# Patient Record
Sex: FEMALE | Race: WHITE | NOT HISPANIC OR LATINO | Employment: FULL TIME | URBAN - METROPOLITAN AREA
[De-identification: names, ages, dates, MRNs, and addresses within clinical notes are randomized per-mention and may not be internally consistent; named-entity substitution may affect disease eponyms.]

---

## 2017-09-22 ENCOUNTER — TRANSFERRED RECORDS (OUTPATIENT)
Dept: MULTI SPECIALTY CLINIC | Facility: CLINIC | Age: 27
End: 2017-09-22

## 2017-09-22 ENCOUNTER — OFFICE VISIT - HEALTHEAST (OUTPATIENT)
Dept: FAMILY MEDICINE | Facility: CLINIC | Age: 27
End: 2017-09-22

## 2017-09-22 DIAGNOSIS — Z13.1 SCREENING FOR DIABETES MELLITUS: ICD-10-CM

## 2017-09-22 DIAGNOSIS — F41.8 DEPRESSION WITH ANXIETY: ICD-10-CM

## 2017-09-22 DIAGNOSIS — Z13.220 SCREENING, LIPID: ICD-10-CM

## 2017-09-22 DIAGNOSIS — Z83.3 FAMILY HISTORY OF DIABETES MELLITUS (DM): ICD-10-CM

## 2017-09-22 DIAGNOSIS — Z12.4 PAPANICOLAOU SMEAR FOR CERVICAL CANCER SCREENING: ICD-10-CM

## 2017-09-22 DIAGNOSIS — Z83.438 FAMILY HISTORY OF HYPERLIPIDEMIA: ICD-10-CM

## 2017-09-22 DIAGNOSIS — Z00.00 ROUTINE GENERAL MEDICAL EXAMINATION AT A HEALTH CARE FACILITY: ICD-10-CM

## 2017-09-22 DIAGNOSIS — Z11.3 SCREENING FOR STD (SEXUALLY TRANSMITTED DISEASE): ICD-10-CM

## 2017-09-22 LAB
CHOLEST SERPL-MCNC: 159 MG/DL
CHOLEST SERPL-MCNC: 159 MG/DL
FASTING STATUS PATIENT QL REPORTED: YES
HDLC SERPL-MCNC: 74 MG/DL
HDLC SERPL-MCNC: 74 MG/DL
LDLC SERPL CALC-MCNC: 76 MG/DL
LDLC SERPL CALC-MCNC: 76 MG/DL
PAP-ABSTRACT: NORMAL
TRIGL SERPL-MCNC: 44 MG/DL
TRIGL SERPL-MCNC: 44 MG/DL

## 2017-09-22 ASSESSMENT — MIFFLIN-ST. JEOR: SCORE: 1430.86

## 2017-09-25 ENCOUNTER — COMMUNICATION - HEALTHEAST (OUTPATIENT)
Dept: FAMILY MEDICINE | Facility: CLINIC | Age: 27
End: 2017-09-25

## 2017-09-27 ENCOUNTER — COMMUNICATION - HEALTHEAST (OUTPATIENT)
Dept: FAMILY MEDICINE | Facility: CLINIC | Age: 27
End: 2017-09-27

## 2017-09-28 ENCOUNTER — COMMUNICATION - HEALTHEAST (OUTPATIENT)
Dept: SCHEDULING | Facility: CLINIC | Age: 27
End: 2017-09-28

## 2017-10-02 LAB
BKR LAB AP ABNORMAL BLEEDING: NO
BKR LAB AP BIRTH CONTROL/HORMONES: NORMAL
BKR LAB AP CERVICAL APPEARANCE: NORMAL
BKR LAB AP GYN ADEQUACY: NORMAL
BKR LAB AP GYN INTERPRETATION: NORMAL
BKR LAB AP HPV REFLEX: NORMAL
BKR LAB AP LMP: NORMAL
BKR LAB AP PATIENT STATUS: NORMAL
BKR LAB AP PREVIOUS ABNORMAL: NORMAL
BKR LAB AP PREVIOUS NORMAL: 2015
HIGH RISK?: NO
PATH REPORT.COMMENTS IMP SPEC: NORMAL
RESULT FLAG (HE HISTORICAL CONVERSION): NORMAL

## 2017-10-24 ENCOUNTER — OFFICE VISIT - HEALTHEAST (OUTPATIENT)
Dept: FAMILY MEDICINE | Facility: CLINIC | Age: 27
End: 2017-10-24

## 2017-10-24 DIAGNOSIS — F41.8 DEPRESSION WITH ANXIETY: ICD-10-CM

## 2017-12-05 ENCOUNTER — OFFICE VISIT - HEALTHEAST (OUTPATIENT)
Dept: FAMILY MEDICINE | Facility: CLINIC | Age: 27
End: 2017-12-05

## 2017-12-05 DIAGNOSIS — M54.2 NECK PAIN ON LEFT SIDE: ICD-10-CM

## 2017-12-05 DIAGNOSIS — R59.0 ADENOPATHY, CERVICAL: ICD-10-CM

## 2017-12-08 ENCOUNTER — HOSPITAL ENCOUNTER (OUTPATIENT)
Dept: ULTRASOUND IMAGING | Facility: CLINIC | Age: 27
Discharge: HOME | End: 2017-12-08
Attending: FAMILY MEDICINE

## 2017-12-08 DIAGNOSIS — M54.2 NECK PAIN ON LEFT SIDE: ICD-10-CM

## 2017-12-08 DIAGNOSIS — R59.0 ADENOPATHY, CERVICAL: ICD-10-CM

## 2018-04-17 ENCOUNTER — OFFICE VISIT - HEALTHEAST (OUTPATIENT)
Dept: FAMILY MEDICINE | Facility: CLINIC | Age: 28
End: 2018-04-17

## 2018-04-17 DIAGNOSIS — F41.8 DEPRESSION WITH ANXIETY: ICD-10-CM

## 2018-04-17 DIAGNOSIS — L98.9 BENIGN SKIN LESION: ICD-10-CM

## 2018-04-29 ENCOUNTER — COMMUNICATION - HEALTHEAST (OUTPATIENT)
Dept: FAMILY MEDICINE | Facility: CLINIC | Age: 28
End: 2018-04-29

## 2018-04-29 DIAGNOSIS — F41.8 DEPRESSION WITH ANXIETY: ICD-10-CM

## 2018-10-21 ENCOUNTER — COMMUNICATION - HEALTHEAST (OUTPATIENT)
Dept: FAMILY MEDICINE | Facility: CLINIC | Age: 28
End: 2018-10-21

## 2018-10-21 DIAGNOSIS — F41.8 DEPRESSION WITH ANXIETY: ICD-10-CM

## 2018-11-16 ENCOUNTER — OFFICE VISIT - HEALTHEAST (OUTPATIENT)
Dept: FAMILY MEDICINE | Facility: CLINIC | Age: 28
End: 2018-11-16

## 2018-11-16 DIAGNOSIS — F41.8 DEPRESSION WITH ANXIETY: ICD-10-CM

## 2018-11-16 DIAGNOSIS — R07.89 ATYPICAL CHEST PAIN: ICD-10-CM

## 2019-05-02 ENCOUNTER — OFFICE VISIT - HEALTHEAST (OUTPATIENT)
Dept: FAMILY MEDICINE | Facility: CLINIC | Age: 29
End: 2019-05-02

## 2019-05-02 DIAGNOSIS — F41.8 DEPRESSION WITH ANXIETY: ICD-10-CM

## 2019-05-02 DIAGNOSIS — I49.9 IRREGULAR HEART BEAT: ICD-10-CM

## 2019-05-02 ASSESSMENT — MIFFLIN-ST. JEOR: SCORE: 1506.83

## 2019-05-03 ENCOUNTER — COMMUNICATION - HEALTHEAST (OUTPATIENT)
Dept: FAMILY MEDICINE | Facility: CLINIC | Age: 29
End: 2019-05-03

## 2019-05-03 ENCOUNTER — HOSPITAL ENCOUNTER (OUTPATIENT)
Dept: CARDIOLOGY | Facility: CLINIC | Age: 29
Discharge: HOME | End: 2019-05-03
Attending: FAMILY MEDICINE

## 2019-05-03 DIAGNOSIS — I49.9 IRREGULAR HEART BEAT: ICD-10-CM

## 2019-07-22 ENCOUNTER — COMMUNICATION - HEALTHEAST (OUTPATIENT)
Dept: FAMILY MEDICINE | Facility: CLINIC | Age: 29
End: 2019-07-22

## 2019-07-22 DIAGNOSIS — F41.8 DEPRESSION WITH ANXIETY: ICD-10-CM

## 2019-12-05 ENCOUNTER — COMMUNICATION - HEALTHEAST (OUTPATIENT)
Dept: FAMILY MEDICINE | Facility: CLINIC | Age: 29
End: 2019-12-05

## 2019-12-09 ENCOUNTER — COMMUNICATION - HEALTHEAST (OUTPATIENT)
Dept: FAMILY MEDICINE | Facility: CLINIC | Age: 29
End: 2019-12-09

## 2019-12-19 ENCOUNTER — AMBULATORY - HEALTHEAST (OUTPATIENT)
Dept: FAMILY MEDICINE | Facility: CLINIC | Age: 29
End: 2019-12-19

## 2019-12-19 DIAGNOSIS — Z32.01 PREGNANCY TEST PERFORMED, PREGNANCY CONFIRMED: ICD-10-CM

## 2019-12-20 ENCOUNTER — OFFICE VISIT - HEALTHEAST (OUTPATIENT)
Dept: FAMILY MEDICINE | Facility: CLINIC | Age: 29
End: 2019-12-20

## 2019-12-20 DIAGNOSIS — Z32.01 PREGNANCY TEST PERFORMED, PREGNANCY CONFIRMED: ICD-10-CM

## 2019-12-20 LAB — HCG UR QL: POSITIVE

## 2019-12-20 ASSESSMENT — PATIENT HEALTH QUESTIONNAIRE - PHQ9: SUM OF ALL RESPONSES TO PHQ QUESTIONS 1-9: 4

## 2020-01-13 ENCOUNTER — PRENATAL OFFICE VISIT (OUTPATIENT)
Dept: NURSING | Facility: CLINIC | Age: 30
End: 2020-01-13
Payer: COMMERCIAL

## 2020-01-13 VITALS
WEIGHT: 181 LBS | BODY MASS INDEX: 30.9 KG/M2 | TEMPERATURE: 98.6 F | SYSTOLIC BLOOD PRESSURE: 111 MMHG | HEART RATE: 90 BPM | HEIGHT: 64 IN | DIASTOLIC BLOOD PRESSURE: 73 MMHG

## 2020-01-13 DIAGNOSIS — Z34.00 SUPERVISION OF NORMAL FIRST PREGNANCY: Primary | ICD-10-CM

## 2020-01-13 DIAGNOSIS — Z23 NEED FOR TDAP VACCINATION: ICD-10-CM

## 2020-01-13 PROBLEM — F41.8 DEPRESSION WITH ANXIETY: Status: ACTIVE | Noted: 2017-09-22

## 2020-01-13 LAB
ABO + RH BLD: NORMAL
ABO + RH BLD: NORMAL
ALBUMIN UR-MCNC: NEGATIVE MG/DL
APPEARANCE UR: CLEAR
BILIRUB UR QL STRIP: NEGATIVE
BLD GP AB SCN SERPL QL: NORMAL
BLOOD BANK CMNT PATIENT-IMP: NORMAL
COLOR UR AUTO: YELLOW
ERYTHROCYTE [DISTWIDTH] IN BLOOD BY AUTOMATED COUNT: 12.3 % (ref 10–15)
GLUCOSE UR STRIP-MCNC: NEGATIVE MG/DL
HBA1C MFR BLD: 5 % (ref 0–5.6)
HCT VFR BLD AUTO: 34.8 % (ref 35–47)
HGB BLD-MCNC: 11.9 G/DL (ref 11.7–15.7)
HGB UR QL STRIP: NEGATIVE
KETONES UR STRIP-MCNC: NEGATIVE MG/DL
LEUKOCYTE ESTERASE UR QL STRIP: NEGATIVE
MCH RBC QN AUTO: 32.6 PG (ref 26.5–33)
MCHC RBC AUTO-ENTMCNC: 34.2 G/DL (ref 31.5–36.5)
MCV RBC AUTO: 95 FL (ref 78–100)
NITRATE UR QL: NEGATIVE
PH UR STRIP: 6 PH (ref 5–7)
PLATELET # BLD AUTO: 301 10E9/L (ref 150–450)
RBC # BLD AUTO: 3.65 10E12/L (ref 3.8–5.2)
SOURCE: NORMAL
SP GR UR STRIP: <=1.005 (ref 1–1.03)
SPECIMEN EXP DATE BLD: NORMAL
UROBILINOGEN UR STRIP-ACNC: 0.2 EU/DL (ref 0.2–1)
WBC # BLD AUTO: 9.8 10E9/L (ref 4–11)

## 2020-01-13 PROCEDURE — 83036 HEMOGLOBIN GLYCOSYLATED A1C: CPT | Performed by: OBSTETRICS & GYNECOLOGY

## 2020-01-13 PROCEDURE — 81003 URINALYSIS AUTO W/O SCOPE: CPT | Performed by: OBSTETRICS & GYNECOLOGY

## 2020-01-13 PROCEDURE — 99207 ZZC NO CHARGE NURSE ONLY: CPT

## 2020-01-13 PROCEDURE — 85027 COMPLETE CBC AUTOMATED: CPT | Performed by: OBSTETRICS & GYNECOLOGY

## 2020-01-13 PROCEDURE — 86901 BLOOD TYPING SEROLOGIC RH(D): CPT | Performed by: OBSTETRICS & GYNECOLOGY

## 2020-01-13 PROCEDURE — 36415 COLL VENOUS BLD VENIPUNCTURE: CPT | Performed by: OBSTETRICS & GYNECOLOGY

## 2020-01-13 PROCEDURE — 86850 RBC ANTIBODY SCREEN: CPT | Performed by: OBSTETRICS & GYNECOLOGY

## 2020-01-13 PROCEDURE — 86762 RUBELLA ANTIBODY: CPT | Performed by: OBSTETRICS & GYNECOLOGY

## 2020-01-13 PROCEDURE — 87389 HIV-1 AG W/HIV-1&-2 AB AG IA: CPT | Performed by: OBSTETRICS & GYNECOLOGY

## 2020-01-13 PROCEDURE — 86780 TREPONEMA PALLIDUM: CPT | Performed by: OBSTETRICS & GYNECOLOGY

## 2020-01-13 PROCEDURE — 86900 BLOOD TYPING SEROLOGIC ABO: CPT | Performed by: OBSTETRICS & GYNECOLOGY

## 2020-01-13 PROCEDURE — 87340 HEPATITIS B SURFACE AG IA: CPT | Performed by: OBSTETRICS & GYNECOLOGY

## 2020-01-13 PROCEDURE — 87086 URINE CULTURE/COLONY COUNT: CPT | Performed by: OBSTETRICS & GYNECOLOGY

## 2020-01-13 RX ORDER — PNV NO.118/IRON FUMARATE/FA 29 MG-1 MG
TABLET,CHEWABLE ORAL
COMMUNITY
End: 2021-09-01

## 2020-01-13 SDOH — HEALTH STABILITY: MENTAL HEALTH
STRESS IS WHEN SOMEONE FEELS TENSE, NERVOUS, ANXIOUS, OR CAN'T SLEEP AT NIGHT BECAUSE THEIR MIND IS TROUBLED. HOW STRESSED ARE YOU?: NOT AT ALL

## 2020-01-13 SDOH — SOCIAL STABILITY: SOCIAL INSECURITY: WITHIN THE LAST YEAR, HAVE YOU BEEN AFRAID OF YOUR PARTNER OR EX-PARTNER?: NO

## 2020-01-13 SDOH — SOCIAL STABILITY: SOCIAL INSECURITY
WITHIN THE LAST YEAR, HAVE TO BEEN RAPED OR FORCED TO HAVE ANY KIND OF SEXUAL ACTIVITY BY YOUR PARTNER OR EX-PARTNER?: NO

## 2020-01-13 SDOH — SOCIAL STABILITY: SOCIAL INSECURITY
WITHIN THE LAST YEAR, HAVE YOU BEEN KICKED, HIT, SLAPPED, OR OTHERWISE PHYSICALLY HURT BY YOUR PARTNER OR EX-PARTNER?: NO

## 2020-01-13 SDOH — SOCIAL STABILITY: SOCIAL NETWORK: ARE YOU MARRIED, WIDOWED, DIVORCED, SEPARATED, NEVER MARRIED, OR LIVING WITH A PARTNER?: MARRIED

## 2020-01-13 SDOH — SOCIAL STABILITY: SOCIAL INSECURITY: WITHIN THE LAST YEAR, HAVE YOU BEEN HUMILIATED OR EMOTIONALLY ABUSED IN OTHER WAYS BY YOUR PARTNER OR EX-PARTNER?: NO

## 2020-01-13 ASSESSMENT — MIFFLIN-ST. JEOR: SCORE: 1522.04

## 2020-01-13 NOTE — LETTER
January 14, 2020      Nehal Wadsworth  [address] [facility] [address]        Dear ,    We are writing to inform you of your test results.    Your test results fall within the expected range(s) or remain unchanged from previous results.  Please continue with current treatment plan.    Resulted Orders   UA without Microscopic   Result Value Ref Range    Color Urine Yellow     Appearance Urine Clear     Glucose Urine Negative NEG^Negative mg/dL    Bilirubin Urine Negative NEG^Negative    Ketones Urine Negative NEG^Negative mg/dL    Specific Gravity Urine <=1.005 1.003 - 1.035    Blood Urine Negative NEG^Negative    pH Urine 6.0 5.0 - 7.0 pH    Protein Albumin Urine Negative NEG^Negative mg/dL    Urobilinogen Urine 0.2 0.2 - 1.0 EU/dL    Nitrite Urine Negative NEG^Negative    Leukocyte Esterase Urine Negative NEG^Negative    Source Midstream Urine    Hemoglobin A1c   Result Value Ref Range    Hemoglobin A1C 5.0 0 - 5.6 %      Comment:      Normal <5.7% Prediabetes 5.7-6.4%  Diabetes 6.5% or higher - adopted from ADA   consensus guidelines.     ABO/Rh type and screen   Result Value Ref Range    ABO A     RH(D) Pos     Antibody Screen Neg     Test Valid Only At          [facility]    Specimen Expires 01/16/2020    Hepatitis B surface antigen   Result Value Ref Range    Hep B Surface Agn Nonreactive NR^Nonreactive   CBC with platelets   Result Value Ref Range    WBC 9.8 4.0 - 11.0 10e9/L    RBC Count 3.65 (L) 3.8 - 5.2 10e12/L    Hemoglobin 11.9 11.7 - 15.7 g/dL    Hematocrit 34.8 (L) 35.0 - 47.0 %    MCV 95 78 - 100 fl    MCH 32.6 26.5 - 33.0 pg    MCHC 34.2 31.5 - 36.5 g/dL    RDW 12.3 10.0 - 15.0 %    Platelet Count 301 150 - 450 10e9/L   HIV Antigen Antibody Combo   Result Value Ref Range    HIV Antigen Antibody Combo Nonreactive NR^Nonreactive          Comment:      HIV-1 p24 Ag & HIV-1/HIV-2 Ab Not Detected   Rubella Antibody IgG Quantitative   Result  Value Ref Range    Rubella Antibody IgG Quantitative 17 IU/mL      Comment:      Positive.  Suggests previous exposure or immunization and probable immunity  Reference Range:    Unvaccinated Negative 0-7 IU/mL  Vaccinated or previous exposure Positive 10 IU/ml or greater     Treponema Abs w Reflex to RPR and Titer   Result Value Ref Range    Treponema Antibodies Nonreactive NR^Nonreactive   Urine Culture Aerobic Bacterial   Result Value Ref Range    Specimen Description Midstream Urine     Culture Micro       <10,000 colonies/mL  mixed urogenital robin  Susceptibility testing not routinely done         If you have any questions or concerns, please call the clinic at the number listed above.       Sincerely,        Caitlyn Montero MD

## 2020-01-14 LAB
BACTERIA SPEC CULT: NORMAL
HBV SURFACE AG SERPL QL IA: NONREACTIVE
HIV 1+2 AB+HIV1 P24 AG SERPL QL IA: NONREACTIVE
RUBV IGG SERPL IA-ACNC: 17 IU/ML
SPECIMEN SOURCE: NORMAL
T PALLIDUM AB SER QL: NONREACTIVE

## 2020-01-17 ENCOUNTER — PRENATAL OFFICE VISIT (OUTPATIENT)
Dept: OBGYN | Facility: CLINIC | Age: 30
End: 2020-01-17
Payer: COMMERCIAL

## 2020-01-17 VITALS
SYSTOLIC BLOOD PRESSURE: 127 MMHG | DIASTOLIC BLOOD PRESSURE: 89 MMHG | BODY MASS INDEX: 31.14 KG/M2 | WEIGHT: 180 LBS | HEART RATE: 75 BPM

## 2020-01-17 DIAGNOSIS — Z34.91 PRENATAL CARE IN FIRST TRIMESTER: Primary | ICD-10-CM

## 2020-01-17 DIAGNOSIS — Z11.3 SCREEN FOR STD (SEXUALLY TRANSMITTED DISEASE): ICD-10-CM

## 2020-01-17 PROCEDURE — 99207 ZZC FIRST OB VISIT: CPT | Performed by: OBSTETRICS & GYNECOLOGY

## 2020-01-17 PROCEDURE — 87491 CHLMYD TRACH DNA AMP PROBE: CPT | Performed by: OBSTETRICS & GYNECOLOGY

## 2020-01-17 PROCEDURE — 87591 N.GONORRHOEAE DNA AMP PROB: CPT | Performed by: OBSTETRICS & GYNECOLOGY

## 2020-01-17 NOTE — LETTER
[address] [address]  [phone]      January 20, 2020      Nehal Wadsworth  [address] [place] [id number]              Dear Nehal,    Your recent gonorrhea and chlamydia cultures were negative.  If you have any questions please call the nurse line at [phone].      Sincerely,      Caitlyn Montero MD

## 2020-01-17 NOTE — PATIENT INSTRUCTIONS
Patient Education     Adapting to Pregnancy: First Trimester    As your body adjusts, you may have to change or limit your daily activities. You ll need more rest. You may also need to use the energy you have more wisely.  Your changing body  Almost every part of your body is affected as you adapt to pregnancy. The uterus and cervix will begin to soften right away. You may not look very pregnant during the first 3 months. But you are likely to have some common signs of early pregnancy:    Nausea    Fatigue    Frequent urination    Mood swings    Bloating of the belly    Constipation    Heartburn    Missed or light periods (first trimester bleeding)    Nipple or breast tenderness and breast swelling  It s not too late to start good habits  What matters most is protecting your baby from this moment on. If you smoke, drink alcohol, or use drugs, now is the time to stop. If you need help, talk with your healthcare provider:    Smoking increases the risk of stillbirth or having a low-birth-weight baby. If you smoke, quit now.    Alcohol and drugs have been linked with miscarriage, birth defects, intellectual disability, and low birth weight. Do not drink alcohol or take drugs.  Tips to relieve nausea  Although nausea can happen at any time of the day, it may be worse in the morning. To help prevent nausea:    Eat small, light meals at frequent intervals.    Drink fluids often.    Get up slowly. Eat a few unsalted crackers before you get out of bed.    Avoid smells that bother you.    Avoid spicy and fatty foods.    Eat an ice pop in your favorite flavor.    Get plenty of rest.    Ask your healthcare provider about taking fadi or vitamin B6 for nausea and vomiting.    Talk with your healthcare provider if you take vitamins that upset your stomach.  Work concerns  The end of the first trimester is a good time to discuss working during pregnancy with your employer. Follow your healthcare provider s advice if your job  "needs you to stand for a long time, work with hazardous tools, or even sit at a desk all day. Your workspace, workload, or scheduled hours may need to be adjusted. Perhaps you can change body postures more often or take an extra break.  Advice for travel  Talk to your healthcare provider first, but the second trimester may be the best time for any travel. You may be advised to avoid certain trips while you re pregnant. Food and water can be concerns in developing countries. Travel by car is a good choice, as you can stop, get out, and stretch. Bring snacks and water along. Fasten the lap belt below your belly, low over your hips. Also be sure to wear the shoulder harness.  Intimacy  Unless your healthcare provider tells you to, there is no reason to stop having sex while you re pregnant. You or your partner may notice changes in desire. Desire may be less in the first trimester, due to nausea and fatigue. In the second trimester, sex may be very enjoyable. The third trimester can be a challenge comfort-wise. Try different positions and see what s best for you both.  Date Last Reviewed: 10/1/2017    0738-3403 [employer]. [address] [place] [address]. All rights reserved. This information is not intended as a substitute for professional medical care. Always follow your healthcare professional's instructions.           Patient Education     Pregnancy: Your First Trimester Changes  The first trimester is a time of rapid development for your baby. Because your baby is growing so quickly, it is important that you start a healthy lifestyle right away. By the end of the first trimester, your baby has formed all of its major body organs and weighs just over an ounce.     Actual size of baby is 1/4\"    Month 1 (weeks 1 to 4)  The placenta (the organ that nourishes your baby) begins to form. The brain, spinal cord, heart, gastrointestinal tract, and lungs begin to develop. Your baby is about " "1/4-inch long by the end of the first month.     Actual size of baby is 1\"      Month 2 (weeks 5 to 8)  All of your baby s major body organs form. The face, fingers, toes, ears, and eyes appear. By the end of the month, your baby is about 1-inch long.     Actual size of baby is 3\"      Month 3 (weeks 9 to 12)  Your baby can open and close its fists and mouth. The sexual organs begin to form. As the first trimester ends, your baby is about 3-inches long.  Date Last Reviewed: 10/1/2017    8109-0642 [employer]. [address] [place] [address], [place] [address]. All rights reserved. This information is not intended as a substitute for professional medical care. Always follow your healthcare professional's instructions.           "

## 2020-01-19 LAB
C TRACH DNA SPEC QL NAA+PROBE: NEGATIVE
N GONORRHOEA DNA SPEC QL NAA+PROBE: NEGATIVE
SPECIMEN SOURCE: NORMAL
SPECIMEN SOURCE: NORMAL

## 2020-01-21 NOTE — PROGRESS NOTES
CHANEL: 2020, by Last Menstrual Period.   SUBJECTIVE:     HPI:  Nehal Wadsworth is a 29 year old  at 11w3d by LMP who presents today for first OB visit.  Patient reports that she had a small amount of uterine cramping early in pregnancy, but has had none lately.  She denies any vaginal bleeding, vaginal discharge, dysuria, fever, chills, changes in vision, chest pain, chest pressure, shortness of breath, vomiting, diarrhea, or edema.  She states that she sometimes gets mild headaches that tea helps resolve and has had minimal nausea in pregnancy.    OBGYN Hx:   LMP 19  Menses q28 days, regular  STD hx: none  Pap hx- 17 NILM, next due 2020    PMH- anxiety and depression, feels mood is overall good now.  Was previously on lexapro   PSH- eye surgery  Meds- PNV, unisom  Allergies- MMR vaccine  SH- quit smoking when found out was pregnant, denies any drug or alcohol use in pregnancy  FH- denies any bleeding or clotting disorders.  Denies any family history of cancer      OBJECTIVE:     EXAM:  /89   Pulse 75   Wt 81.6 kg (180 lb)   LMP 2019   BMI 31.14 kg/m   Body mass index is 31.14 kg/m .    GENERAL: healthy, alert and no distress  EYES: Eyes grossly normal to inspection, conjunctivae and sclerae normal  NECK: no adenopathy, no asymmetry, masses, or scars and thyroid normal to palpation  RESP: lungs clear to auscultation - no rales, rhonchi or wheezes  BREAST: normal without masses, tenderness or nipple discharge and no palpable axillary masses or adenopathy  CV: regular rate and rhythm, normal S1 S2, no S3 or S4, no murmur, click or rub, no peripheral edemaABDOMEN: soft, nontender, no hepatosplenomegaly, no masses and bowel sounds normal   (female): normal female external genitalia, normal urethral meatus, vaginal mucosa pink, moist, well rugated, and normal cervix, adnexa without masses or tenderness, 10 week sized uterus   MS: no gross musculoskeletal defects noted, no edema  SKIN: no  suspicious lesions or rashes  NEURO: Normal strength and tone, mentation intact and speech normal  PSYCH: mentation appears normal, affect normal/bright    BSUS: single IUP with + cardiac activity    ASSESSMENT/PLAN:     Nehal Wadsworth is a 29 year old  at 11w3d by LMP who presents today for first OB visit, currently doing well.    (Z34.91) Prenatal care in first trimester  (primary encounter diagnosis)  Comment: Reviewed normal timing of visit in pregnancy, team structure on labor and delivery including resident and medical student participation in care.  Discussed genetic testing and patient declines.  Will plan for dating ultraosund.   Plan:  OB < 14 Weeks Single  Counseling given:   - Follow up in 4 weeks for return OB visit.  - Recommended weight gain for pregnancy: 11-20    (Z11.3) Screen for STD (sexually transmitted disease)  Comment: Routine STD screen in pregnancy completed.  Plan: NEISSERIA GONORRHOEA PCR, CHLAMYDIA TRACHOMATIS        PCR    Caitlyn Montero MD

## 2020-02-04 ENCOUNTER — ANCILLARY PROCEDURE (OUTPATIENT)
Dept: ULTRASOUND IMAGING | Facility: CLINIC | Age: 30
End: 2020-02-04
Attending: OBSTETRICS & GYNECOLOGY
Payer: COMMERCIAL

## 2020-02-04 DIAGNOSIS — Z34.91 PRENATAL CARE IN FIRST TRIMESTER: ICD-10-CM

## 2020-02-04 PROCEDURE — 76801 OB US < 14 WKS SINGLE FETUS: CPT | Performed by: OBSTETRICS & GYNECOLOGY

## 2020-02-13 ENCOUNTER — PRENATAL OFFICE VISIT (OUTPATIENT)
Dept: OBGYN | Facility: CLINIC | Age: 30
End: 2020-02-13
Payer: COMMERCIAL

## 2020-02-13 VITALS
SYSTOLIC BLOOD PRESSURE: 109 MMHG | BODY MASS INDEX: 32.35 KG/M2 | WEIGHT: 187 LBS | HEART RATE: 81 BPM | DIASTOLIC BLOOD PRESSURE: 73 MMHG

## 2020-02-13 DIAGNOSIS — Z34.92 PRENATAL CARE IN SECOND TRIMESTER: Primary | ICD-10-CM

## 2020-02-13 PROCEDURE — 99207 ZZC PRENATAL VISIT: CPT | Performed by: OBSTETRICS & GYNECOLOGY

## 2020-02-13 NOTE — PROGRESS NOTES
[facility]- OBGYN    CC: routine prenatal visit.    S:Nehal Wadsworth is a 29 year old  at 14w6d by LMP c/w 13w4d ultrasound  who presents today for routine prenatal visit.  Patient reports that her nausea is now resolved.  She has had continued constipation issues.  She has been drinking prune juice with some improvement. She has rare dull low pelvic cramping.  Patient denies any vaginal bleeding of vaginal discharge.  She denies any headache, changes in vision, chest pain, chest pressure, shortness of breath, nausea, vomiting, diarrhea.      O:   Patient Vitals for the past 24 hrs:   BP Pulse Weight   20 1153 109/73 81 84.8 kg (187 lb)   ]  Exam:  General- awake, alert, answering questions appropriately, appears comfortable  CV- regular rate  Lung- breathing comfortably on room air  Ext- bilateral lower extremities with no edema    Doptones- 152 bpm    A&P: Nehal Wadsworth is a 29 year old  at 14w6d by LMP c/w 13w4d ultrasound  who presents today for routine prenatal visit.    (Z34.92) Prenatal care in second trimester  (primary encounter diagnosis)  Comment: Overall doing well.  Patient would and fiance had discussion and are thinking they do want NIPT testing.  MFM referral placed.  Reviewed recommendation for anatomy ultrasound in 4-6 weeks.   Plan: US OB > 14 Weeks, [facility]         REFERRAL-PREGNANCY        Routine prenatal visit in 4 weeks (same day as us)      Caitlyn Montero MD

## 2020-02-13 NOTE — PATIENT INSTRUCTIONS
Patient Education     Adapting to Pregnancy: Second Trimester    Keep up the healthy habits you started in your first trimester. You might be a little more tired than normal. So plan your day wisely. Look at the tips below and choose the ones that suit your lifestyle.  If you have any questions, check with your healthcare provider.  If you work  If you can, adjust your work with your employer to fit your needs. Try these tips:    If you stand for long periods, find ways to do some tasks while sitting. Also, try to stand with 1 foot resting on a low stool or ledge. Shift your weight from foot to foot often. Wear low-heeled shoes.    If you sit, keep your knees level with your hips. Rest your feet on a firm surface. Sit tall with support for your low back.    If you work long hours, ask about adjusting your schedule. Try taking shorter breaks more often.  When you travel  The second trimester may be the best time for any travel. Talk to your healthcare provider about any special plans you may need to make. Always:    Wear a seat belt. Fasten the lap part under your belly. Wear the shoulder part also.    Take breaks often during long trips by car or plane. Move around to stretch your legs.    Drink plenty of fluids on flights. The air in plane cabins is very dry.    Avoid hot climates or high altitudes if you are not used to them.    Avoid places where the food and water might make you sick.    Make sure you are up-to-date on all immunizations, including the flu vaccine. This is especially important when traveling overseas.  Taking time to relax  Find time to rest and relax at work or at home:    Take short time-outs daily. Do relaxation exercises.    Breathe deeply during stressful times.    Try not to take on too much. Plan tasks for times when you have the most energy.    Take naps when you can. Or just sit and relax.    After week 16, avoid lying on your back for more than a few minutes. Instead, lie on your  side. Switch sides often.  Continuing as lovers  Unless your healthcare provider tells you otherwise, there is no reason to stop having sex now. Blood supply increases to the pelvic area in the second trimester. Because of this, sex might be more enjoyable. Try different positions and see what s best. Also, talk to your partner about any changes in desire. Spotting may happen after sex. Be sure to let your healthcare provider know if there is heavy bleeding.  Keeping your environment safe  You can still clean house and use scented products. Just take some simple precautions:    Wear gloves when using cleaning fluids.    Open windows to let in fresh air. Use a fan if you paint.    Avoid secondhand smoke.    Don t breathe fumes from nail polish, hair spray, cleansers, or other chemicals.  Date Last Reviewed: 1/1/2018 2000-2019 [employer]. [address] [place] [address]. All rights reserved. This information is not intended as a substitute for professional medical care. Always follow your healthcare professional's instructions.           Patient Education     Pregnancy: Your Second Trimester Changes  Each day, you and your baby are changing and growing together. Here s a quick look at what s happening to both of you.  How you are changing  Even when you don t notice it, your body is adapting to meet the needs of your growing baby. The changes in your body might also affect your moods.  Your body  Your uterus expands as baby grows. As the weeks go by, you will feel more pressure on your bladder, stomach, and other organs. You may notice some skin color changes on your forehead, nose, or cheeks. Freckles may darken, and moles may grow. You may notice a darker line on your abdomen between your belly button and pubic bone in the midline.  Your moods  The second trimester is often easier than the first. Still, be prepared for mood swings. These are due to the increase in hormones (chemicals that  affect the way organs work) produced by your body. These mood swings are a normal part of pregnancy.  How your baby is growing          Month 4  Baby s heartbeat may be heard with a Doppler (hand-held ultrasound device) by 9 to 10 weeks. Eyebrows, eyelashes, and fingernails begin to form.  Month 5  You may feel your baby move. After a growth spurt, your baby nears 10 inches. Month 6  Baby s fingerprints have formed. Your baby weighs about 1 to 2 pounds and is about 12 inches long.   Date Last Reviewed: 1/1/2018 2000-2019 [employer]. [address] [place] [address], [place] [address]. All rights reserved. This information is not intended as a substitute for professional medical care. Always follow your healthcare professional's instructions.

## 2020-03-11 ENCOUNTER — HEALTH MAINTENANCE LETTER (OUTPATIENT)
Age: 30
End: 2020-03-11

## 2020-03-17 ENCOUNTER — ANCILLARY PROCEDURE (OUTPATIENT)
Dept: ULTRASOUND IMAGING | Facility: CLINIC | Age: 30
End: 2020-03-17
Attending: OBSTETRICS & GYNECOLOGY
Payer: COMMERCIAL

## 2020-03-17 ENCOUNTER — PRENATAL OFFICE VISIT (OUTPATIENT)
Dept: OBGYN | Facility: CLINIC | Age: 30
End: 2020-03-17
Attending: OBSTETRICS & GYNECOLOGY
Payer: COMMERCIAL

## 2020-03-17 VITALS
DIASTOLIC BLOOD PRESSURE: 75 MMHG | HEART RATE: 67 BPM | SYSTOLIC BLOOD PRESSURE: 114 MMHG | BODY MASS INDEX: 32.87 KG/M2 | WEIGHT: 190 LBS

## 2020-03-17 DIAGNOSIS — Z34.92 PRENATAL CARE IN SECOND TRIMESTER: ICD-10-CM

## 2020-03-17 DIAGNOSIS — Z34.92 PRENATAL CARE IN SECOND TRIMESTER: Primary | ICD-10-CM

## 2020-03-17 PROCEDURE — 76805 OB US >/= 14 WKS SNGL FETUS: CPT | Performed by: OBSTETRICS & GYNECOLOGY

## 2020-03-17 PROCEDURE — 99207 ZZC PRENATAL VISIT: CPT | Performed by: OBSTETRICS & GYNECOLOGY

## 2020-03-18 NOTE — PROGRESS NOTES
Doing well.   Feeling movement.   Fetal survey prelim normal.   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 Performing rigid hand hygiene and other safety measures.   Discussed likely phone visit at 24 weeks, then live visit at 28 weeks for GCT and TDaP. Rh pos.  Having carpal tunnel symptoms, pre-existing but worsening. Wearing splints at night, compression sleeves while typing.

## 2020-04-06 ENCOUNTER — TELEPHONE (OUTPATIENT)
Dept: MATERNAL FETAL MEDICINE | Facility: CLINIC | Age: 30
End: 2020-04-06

## 2020-04-06 NOTE — TELEPHONE ENCOUNTER
KAMILAM received referral from [facility].  Calls made to patient, messages were left, and no return call from patient.  Referring clinic notified, removing order.       Janet Estrella

## 2020-04-07 ENCOUNTER — TELEPHONE (OUTPATIENT)
Dept: OBGYN | Facility: CLINIC | Age: 30
End: 2020-04-07

## 2020-04-07 NOTE — TELEPHONE ENCOUNTER
"Patient currently scheduled as phone visit, but appointment notes state \"KEEP\". Please advise if patient should be seen in clinic or as a phone visit. Thank you!  Angie Estrella,      "

## 2020-04-14 ENCOUNTER — PRENATAL OFFICE VISIT (OUTPATIENT)
Dept: OBGYN | Facility: CLINIC | Age: 30
End: 2020-04-14
Payer: COMMERCIAL

## 2020-04-14 DIAGNOSIS — Z34.80 SUPERVISION OF OTHER NORMAL PREGNANCY, ANTEPARTUM: Primary | ICD-10-CM

## 2020-04-14 PROCEDURE — 99207 ZZC PRENATAL VISIT: CPT | Performed by: OBSTETRICS & GYNECOLOGY

## 2020-04-14 NOTE — PROGRESS NOTES
Phone visit for modified prenatal care for COVID.  Will send Covid AVS.   Acid reflux, will try antacids.  Fetus active.  Will plan office visit next, with GCT and Tdap.  RTC 4 weeks.  AM

## 2020-04-14 NOTE — PATIENT INSTRUCTIONS
The best way to prevent an infection is to avoid being exposed to the virus. We recommend that you wash your hands frequently and avoid touching your eyes, nose or mouth.  Practice social distancing by staying home as much as possible, avoiding gatherings and minimize trips for essentials. You should especially avoid any contact with people who are sick. It is possible that people who have the virus have little or no symptoms which is why social distancing is so important to avoid spreading the virus. Clean frequently touched surfaces daily. If you do start to have symptoms such as cough, fever or mild shortness of breath, you should stay home for at least 14 days.  If your symptoms are worrisome or severe or you are scheduled during this time to have a clinic visit you should call us at [phone].    Due to anticipated shortage of blood products we recommend all pregnant women take an iron supplement (ferrous gluconate or ferrous sulfate) in addition to a prenatal vitamin.     The hospital has strict visitor restrictions at this time for your safety. Please be aware that visitor restrictions are subject to change. You are allowed to have one healthy adult visitor during your hospital stay, it must be the same person the entire time and they must stay with you at the hospital the entire time (they are not allowed to come and go).     For information about Covid-19 and pregnancy we look to the center for disease control, the CDC. You can look at this information online at:   https://www.cdc.gov/coronavirus/2019-ncov/hcp/pregnant-women-faq.html    [email or web address]

## 2020-05-12 ENCOUNTER — PRENATAL OFFICE VISIT (OUTPATIENT)
Dept: OBGYN | Facility: CLINIC | Age: 30
End: 2020-05-12
Payer: COMMERCIAL

## 2020-05-12 VITALS
DIASTOLIC BLOOD PRESSURE: 80 MMHG | BODY MASS INDEX: 33.68 KG/M2 | HEART RATE: 76 BPM | WEIGHT: 197.3 LBS | SYSTOLIC BLOOD PRESSURE: 128 MMHG | HEIGHT: 64 IN | TEMPERATURE: 98.3 F

## 2020-05-12 DIAGNOSIS — Z34.92 PRENATAL CARE IN SECOND TRIMESTER: ICD-10-CM

## 2020-05-12 DIAGNOSIS — Z34.80 SUPERVISION OF OTHER NORMAL PREGNANCY, ANTEPARTUM: Primary | ICD-10-CM

## 2020-05-12 DIAGNOSIS — A63.0 GENITAL WARTS: ICD-10-CM

## 2020-05-12 DIAGNOSIS — Z23 NEED FOR TDAP VACCINATION: ICD-10-CM

## 2020-05-12 LAB
GLUCOSE 1H P 50 G GLC PO SERPL-MCNC: 115 MG/DL (ref 60–129)
HGB BLD-MCNC: 11.2 G/DL (ref 11.7–15.7)

## 2020-05-12 PROCEDURE — 00000218 ZZHCL STATISTIC OBHBG - HEMOGLOBIN: Performed by: OBSTETRICS & GYNECOLOGY

## 2020-05-12 PROCEDURE — 82950 GLUCOSE TEST: CPT | Performed by: OBSTETRICS & GYNECOLOGY

## 2020-05-12 PROCEDURE — 99207 ZZC PRENATAL VISIT: CPT | Performed by: OBSTETRICS & GYNECOLOGY

## 2020-05-12 PROCEDURE — 36415 COLL VENOUS BLD VENIPUNCTURE: CPT | Performed by: OBSTETRICS & GYNECOLOGY

## 2020-05-12 PROCEDURE — 90471 IMMUNIZATION ADMIN: CPT | Performed by: OBSTETRICS & GYNECOLOGY

## 2020-05-12 PROCEDURE — 90715 TDAP VACCINE 7 YRS/> IM: CPT | Performed by: OBSTETRICS & GYNECOLOGY

## 2020-05-12 ASSESSMENT — ANXIETY QUESTIONNAIRES
7. FEELING AFRAID AS IF SOMETHING AWFUL MIGHT HAPPEN: NOT AT ALL
5. BEING SO RESTLESS THAT IT IS HARD TO SIT STILL: NOT AT ALL
3. WORRYING TOO MUCH ABOUT DIFFERENT THINGS: NOT AT ALL
GAD7 TOTAL SCORE: 0
2. NOT BEING ABLE TO STOP OR CONTROL WORRYING: NOT AT ALL
1. FEELING NERVOUS, ANXIOUS, OR ON EDGE: NOT AT ALL
IF YOU CHECKED OFF ANY PROBLEMS ON THIS QUESTIONNAIRE, HOW DIFFICULT HAVE THESE PROBLEMS MADE IT FOR YOU TO DO YOUR WORK, TAKE CARE OF THINGS AT HOME, OR GET ALONG WITH OTHER PEOPLE: NOT DIFFICULT AT ALL
6. BECOMING EASILY ANNOYED OR IRRITABLE: NOT AT ALL

## 2020-05-12 ASSESSMENT — PATIENT HEALTH QUESTIONNAIRE - PHQ9
SUM OF ALL RESPONSES TO PHQ QUESTIONS 1-9: 2
5. POOR APPETITE OR OVEREATING: NOT AT ALL

## 2020-05-12 ASSESSMENT — MIFFLIN-ST. JEOR: SCORE: 1595.98

## 2020-05-12 NOTE — Clinical Note
Pap smear done on this date: 9/22/2017 (approximately), by this group: [facility], results were NIL.

## 2020-05-12 NOTE — PROGRESS NOTES
Please abstract the following data from this visit with this patient into the appropriate field in Epic:    Tests that can be patient reported without a hard copy:    Pap smear done on this date: 9/22/2017 (approximately), [facility], results were NIL.       Clinic Administered Medication Documentation      Injectable Medication Documentation    Patient was given Tdap. Prior to medication administration, verified patients identity using patient s name and date of birth. Please see MAR and medication order for additional information. Patient instructed to remain in clinic for 15 minutes.      Was entire vial of medication used? Yes  Vial/Syringe: Syringe  Expiration Date:  3/21/2022  Was this medication supplied by the patient? No

## 2020-05-12 NOTE — PATIENT INSTRUCTIONS
Patient Education     Adapting to Pregnancy: Third Trimester    Although common during pregnancy, some discomforts may seem worse in the final weeks. Simple lifestyle changes can help. Take care of yourself. And ask your partner to help out with small tasks.  Limiting leg problems  Ways to combat leg issues:    Wear support hose all day.    Avoid snug shoes and clothes that bind, like tight pants and socks with elastic tops.    Sit with your feet and legs raised often.  Caring for your breasts  Tips to follow include:    Wash with plain water. Avoid using harsh soaps or rubbing alcohol. They may cause dryness.    Wear a nursing bra for extra support. It can also hide any leaks from your nipples.  Controlling hemorrhoids  Ways to avoid hemorrhoids include:    Eat foods that are high in fiber. Also, exercise and drink enough fluids. This will reduce constipation and hemorrhoids.    Sleep and nap on your side. This limits pressure on the veins of your rectum.    Try not to stand or sit for long periods.  Controlling back pain  As your body changes during pregnancy, your back must work in new ways. Back pain is due to many causes. Physical changes in your body can strain your back and its supporting muscles. Also, hormones (chemicals that carry messages throughout the body) increase during pregnancy. This can affect how your muscles and joints work together. All of these changes can lead to pain. Pain may be felt in the upper or lower back. Pain is also common in the pelvis. Some pregnant women have sciatica. This is pain caused by pressure on the sciatic nerve running down the back of the leg. Ask your healthcare provider for specific tips and exercises to help control your back pain.  Tips to help you rest  Good rest and sleep will help you feel better. Here are some ideas:    Ask your partner to massage your shoulders, neck, or back.    Limit the errands you do each day.    Lie down in the afternoon or after work  for a few minutes.    Take a warm bath before you go to sleep.    Drink warm milk or teas without caffeine.    Avoid coffee, black tea, and cola.  Stopping heartburn    Avoid spicy, greasy, fried, or acidic foods.    Eat small amounts more often. Eat slowly.   Wait 2 hours after eating before lying down.    Sleep with your upper body raised 6 inches.   Managing mood swings  Ways to manage mood swings include:    Know that mood changes are normal.    Exercise often, but get plenty of rest.    Address any concerns and limit stress. Talking to your partner, other women, or your healthcare provider may help.  Dealing with urinary frequency  Tips to deal with having to urinate often include:    Drink plenty of water all day. If you drink a lot in the evening, though, you may have to get up more in the night.    Limit coffee, black tea, and cola.  Date Last Reviewed: 2/1/2018 2000-2019 [employer]. [address] [place] [address]. All rights reserved. This information is not intended as a substitute for professional medical care. Always follow your healthcare professional's instructions.           Patient Education     Comfort Tips During Pregnancy    Talk with your healthcare provider before using pain-relieving medicine at any time during your pregnancy.  First trimester tips  Nausea    Get up slowly. Eat a few unsalted crackers before you get out of bed.    Avoid smells that bother you.    Eat small bland low fat, light high-protein meals at frequent intervals.    Sip on water, weak tea, or clear soft drinks, like ginger ale. Eat ice chips.  Fatigue    Take catnaps when you can.    Get regular exercise.    Accept help from others.    Practice good sleep habits, like going to bed and getting up at the same time each day. Use your bed only for sleep and sex.  Mood swings    Talk about your feelings with others, including other mothers.    Limit sugar, chocolate, and caffeine.    Eat a healthy  diet. Don t skip meals.    Get regular exercise.  Headaches    Get fresh air and exercise.    Relax and get enough rest.    Check with your healthcare provider before taking any pain medicines.  Second trimester tips  Here are some suggestions to help you cope:    To limit ankle swelling, sit with your feet raised or wear support hose.    If you have pain in your groin and stomach (round ligament pain), avoid sudden twisting movements.    For leg cramps, flexing your foot often brings immediate relief. You also may try massaging your calf in long, downward strokes, or stretching your legs before going to bed. Get enough exercise and wear shoes with flexible soles.  Third trimester tips  Reducing heartburn    Eat small, light meals throughout the day rather than 3 large ones.    Sleep with your upper body raised 6 inches. Don t lie down until 2 hours after you eat.    Don't eat greasy, fried, or spicy foods.    Avoid citrus fruits and juices.  Treating constipation    Eat foods high in fiber (whole-grain foods, fresh fruit and vegetables).    Drink plenty of water.    Get regular exercise.    Discuss other medicines (like docusate and psyllium) with your healthcare provider.  Taking care of your breasts    Avoid using harsh soaps or alcohol, which can cause excessive dryness.    Wear nursing bras. They provide more support than regular bras and can be used after pregnancy if you breastfeed.  Getting a good night s sleep    Take a warm shower before bed.    Sleep on a firm mattress.    Lie on your side with 1 leg crossed over the other.    Use pillows to support arms, legs, and belly.  Date Last Reviewed: 10/1/2017    7354-8700 [employer]. [address] [place] [address], [place] [address]. All rights reserved. This information is not intended as a substitute for professional medical care. Always follow your healthcare professional's instructions.           Patient Education     Pregnancy: Your Third Trimester  Changes  As the baby grows, your body changes too. You may also see signs that your body is getting ready for labor. Be patient. Within a few more weeks, your baby will be born.  How you are changing  Your body is preparing for the birth of your baby. Some of the most common changes are listed below. If you have any questions or concerns, ask your healthcare provider:    You ll gain more weight from fluids, extra blood, and fat deposits.    Your breasts will grow as your body gets ready to feed the baby. They may be more tender. You may also notice a slight yellow or white discharge from the nipples.    Discharge from your vagina may increase. This is normal.    You might see some skin color changes on your forehead, cheeks, or nose. Most of these will go away after you deliver.  How your baby is growing       Month 7  Your baby can open and close his or her eyes and weighs around 4 pounds. If born prematurely (too early), your baby would likely survive with special care. Month 8  Your baby is building up body fat and weighs around 6 pounds. Month 9  Your baby weighs nearly 7 pounds and is about 19 to 21 inches long. In other words, any day now...   Date Last Reviewed: 2/1/2018 2000-2019 [employer]. [address] [place] [address], [place] [id number]. All rights reserved. This information is not intended as a substitute for professional medical care. Always follow your healthcare professional's instructions.           Patient Education     Kick Counts    It s normal to worry about your baby s health. One way you can know your baby s doing well is to record the baby s movements once a day. This is called a kick count. Remember to take your kick count records to all your appointments with your healthcare provider.  How to count kicks  Time how long it takes you to feel 10 kicks, flutters, swishes, or rolls. Ideally, you want to feel at least 10 movements within 2 hours. You will likely feel 10 movements in  less time than that.  Starting at 28 weeks, count your baby's movements daily. Follow your healthcare provider's instructions for kick counting. Here are tips for counting kicks:    Choose a time when the baby is active, such as after a meal.     Sit comfortably or lie on your side.     The first time the baby moves, write down the time.     Count each movement until the baby has moved 10 times. This can take from 20 minutes to 2 hours.     If you have not felt 10 kicks by the end of the second hour, wait a few hours. Then try again.    Try to do it at the same time each day.  When to call your healthcare provider  Call your healthcare provider right away if:    You do a couple sets of kick counts during the day and your baby moves fewer than 10 times in 2 hours    Your baby moves much less often than on the days before.    You have not felt your baby move all day.  Date Last Reviewed: 12/1/2017 2000-2019 [employer]. [address] [place] [address], [place] [address]. All rights reserved. This information is not intended as a substitute for professional medical care. Always follow your healthcare professional's instructions.

## 2020-05-12 NOTE — PROGRESS NOTES
"[facility]- OBGYTY    CC: routine prenatal visit.    S:Patient reports that she is doing well.  Patient denies any contractions, vaginal bleeding, leakage of fluid.  She states she is feeling normal fetal movement.  She denies any headache, changes in vision, chest pain, chest pressure, shortness of breath, nausea, vomiting.        O:   Patient Vitals for the past 24 hrs:   BP Temp Temp src Pulse Height Weight   20 1143 128/80 98.3  F (36.8  C) Oral 76 1.619 m (5' 3.75\") 89.5 kg (197 lb 4.8 oz)   ]  Exam:  General- awake, alert, answering questions appropriately, appears comfortable  CV- regular rate  Lung- breathing comfortably on room air  - two 3 mm flat verrucae superior to patient's clitoral ochoa on the right.  One 2mm pedunculated verruca  Ext- bilateral lower extremities with no edema    Doptones- 126 bpm  Fundal height-28 cm    A&P: Nehal Wadsworth is a 29 year old  at 27w4d by LMP c/w 13w4d us who presents today for routine prenatal visit.      (Z34.80) Supervision of other normal pregnancy, antepartum  (primary encounter diagnosis)  Comment: Doing well.  Discussed GCT, Tdap today.   Reviewed hospital and clinic COVID19 visitor restrictions, masking policy, and universal masking.  Discussed s/sx of PPROM, PTL, and fetal kick counts.  Plan: Phone visit in 2 weeks.  In person visit in 4 weeks.     (Z23) Need for Tdap vaccination  Comment: Patient due for Tdap vaccine today  Plan: s/p Tdap vaccine    (A63.0) Genital warts  Comment: three small genital warts seen on exam.  Patient unsure how long present.  Not bothersome.    Plan: Plan for excision post-partum if persistent vs. aldara treatment    Caitlyn Montero MD      "

## 2020-05-13 ASSESSMENT — ANXIETY QUESTIONNAIRES: GAD7 TOTAL SCORE: 0

## 2020-05-26 ENCOUNTER — PRENATAL OFFICE VISIT (OUTPATIENT)
Dept: OBGYN | Facility: CLINIC | Age: 30
End: 2020-05-26
Payer: COMMERCIAL

## 2020-05-26 DIAGNOSIS — Z34.80 SUPERVISION OF OTHER NORMAL PREGNANCY, ANTEPARTUM: Primary | ICD-10-CM

## 2020-05-26 PROCEDURE — 99207 ZZC PRENATAL VISIT: CPT | Performed by: OBSTETRICS & GYNECOLOGY

## 2020-05-26 NOTE — PATIENT INSTRUCTIONS
Patient Education     Kick Counts    It s normal to worry about your baby s health. One way you can know your baby s doing well is to record the baby s movements once a day. This is called a kick count. Remember to take your kick count records to all your appointments with your healthcare provider.  How to count kicks  Time how long it takes you to feel 10 kicks, flutters, swishes, or rolls. Ideally, you want to feel at least 10 movements within 2 hours. You will likely feel 10 movements in less time than that.  Starting at 28 weeks, count your baby's movements daily. Follow your healthcare provider's instructions for kick counting. Here are tips for counting kicks:    Choose a time when the baby is active, such as after a meal.     Sit comfortably or lie on your side.     The first time the baby moves, write down the time.     Count each movement until the baby has moved 10 times. This can take from 20 minutes to 2 hours.     If you have not felt 10 kicks by the end of the second hour, wait a few hours. Then try again.    Try to do it at the same time each day.  When to call your healthcare provider  Call your healthcare provider right away if:    You do a couple sets of kick counts during the day and your baby moves fewer than 10 times in 2 hours    Your baby moves much less often than on the days before.    You have not felt your baby move all day.  Date Last Reviewed: 12/1/2017 2000-2019 [employer]. [address] [place] [address], [place] [address]. All rights reserved. This information is not intended as a substitute for professional medical care. Always follow your healthcare professional's instructions.

## 2020-05-26 NOTE — PROGRESS NOTES
Telephone Encounter     Phone visit for modified prenatal care for COVID.    Start time:1332  Stop time:1344    [facility]- OBGYN    CC: phone prenatal visit.    S:  Patient reports feeling well.  Patient denies any contractions, vaginal bleeding, leakage of fluid.  She states she is feeling normal fetal movement.  A&P: Nehal Wadsworth is a 29 year old  at 29w4d by LMP c/w 13w4d us who presents today for routine phone prenatal visit.    (Z34.80) Supervision of other normal pregnancy, antepartum  (primary encounter diagnosis)  Comment: Doing well.  Discussed location of delivery, call schedule, length of stay, scenarios where  is indicated, and next visit timing.  Plan: Next visit with Dr. Denton 2020    Caitlyn Montero MD

## 2020-06-09 ENCOUNTER — PRENATAL OFFICE VISIT (OUTPATIENT)
Dept: OBGYN | Facility: CLINIC | Age: 30
End: 2020-06-09
Payer: COMMERCIAL

## 2020-06-09 VITALS
TEMPERATURE: 98 F | SYSTOLIC BLOOD PRESSURE: 102 MMHG | WEIGHT: 199.8 LBS | BODY MASS INDEX: 34.11 KG/M2 | HEART RATE: 104 BPM | DIASTOLIC BLOOD PRESSURE: 62 MMHG | HEIGHT: 64 IN

## 2020-06-09 DIAGNOSIS — Z34.80 SUPERVISION OF OTHER NORMAL PREGNANCY, ANTEPARTUM: Primary | ICD-10-CM

## 2020-06-09 PROCEDURE — 99207 ZZC PRENATAL VISIT: CPT | Performed by: OBSTETRICS & GYNECOLOGY

## 2020-06-09 ASSESSMENT — MIFFLIN-ST. JEOR: SCORE: 1607.32

## 2020-06-09 NOTE — PROGRESS NOTES
31w4d  No complaints.  Baby is moving a lot.    Plan on breastfeeding? Yes  Birthcontrol? Probably condoms.  Does not tolerate oral contraceptive pills well  Gender on ultrasound? boy  Circumsion? Yes  Peds doc? Will use her primary care doc, Stefanie Patel   Patient noting anxiety about labor, the pain , etc.  discussed this issue with patient and reassured her.   Questions answered. RR

## 2020-06-23 ENCOUNTER — PRENATAL OFFICE VISIT (OUTPATIENT)
Dept: OBGYN | Facility: CLINIC | Age: 30
End: 2020-06-23
Payer: COMMERCIAL

## 2020-06-23 DIAGNOSIS — Z34.80 SUPERVISION OF OTHER NORMAL PREGNANCY, ANTEPARTUM: Primary | ICD-10-CM

## 2020-06-23 PROCEDURE — 99207 ZZC PRENATAL VISIT: CPT | Performed by: OBSTETRICS & GYNECOLOGY

## 2020-06-23 NOTE — PROGRESS NOTES
33w4d  ~ telephone encounter   Starting to feel 3rd trimester aches and pains. Intermittent swelling of feet and ankles.  Baby is moving lots. She did a virtual tour of the hospital. Feels comfortable with that. Hoping to use the  Sling for labor support. Staying active, walking the dog.  We discussed upcoming GBS testing at 36 wks.  discussed normal expectations in 3rd including onset of B-H ctx's and what is normal. Clinic visit in 2 wks already scheduled. RR

## 2020-07-03 ENCOUNTER — TELEPHONE (OUTPATIENT)
Dept: OBGYN | Facility: CLINIC | Age: 30
End: 2020-07-03

## 2020-07-03 NOTE — TELEPHONE ENCOUNTER
Forms signed and returned. Faxed to number provided and copy to HIM.  Angie Estrella, Surgery Coordinator

## 2020-07-03 NOTE — TELEPHONE ENCOUNTER
Forms received and filled out. Placed in provider basket to be reviewed and signed.  Angie Estrella, Surgery Coordinator

## 2020-07-07 ENCOUNTER — COMMUNICATION - HEALTHEAST (OUTPATIENT)
Dept: FAMILY MEDICINE | Facility: CLINIC | Age: 30
End: 2020-07-07

## 2020-07-07 ENCOUNTER — PRENATAL OFFICE VISIT (OUTPATIENT)
Dept: OBGYN | Facility: CLINIC | Age: 30
End: 2020-07-07
Payer: COMMERCIAL

## 2020-07-07 VITALS
HEART RATE: 106 BPM | SYSTOLIC BLOOD PRESSURE: 122 MMHG | BODY MASS INDEX: 36.84 KG/M2 | TEMPERATURE: 97.5 F | WEIGHT: 207.9 LBS | DIASTOLIC BLOOD PRESSURE: 74 MMHG | HEIGHT: 63 IN | OXYGEN SATURATION: 98 %

## 2020-07-07 DIAGNOSIS — Z34.80 SUPERVISION OF OTHER NORMAL PREGNANCY, ANTEPARTUM: Primary | ICD-10-CM

## 2020-07-07 LAB — HGB BLD-MCNC: 10.5 G/DL (ref 11.7–15.7)

## 2020-07-07 PROCEDURE — 36415 COLL VENOUS BLD VENIPUNCTURE: CPT | Performed by: OBSTETRICS & GYNECOLOGY

## 2020-07-07 PROCEDURE — 99207 ZZC PRENATAL VISIT: CPT | Performed by: OBSTETRICS & GYNECOLOGY

## 2020-07-07 PROCEDURE — 00000218 ZZHCL STATISTIC OBHBG - HEMOGLOBIN: Performed by: OBSTETRICS & GYNECOLOGY

## 2020-07-07 PROCEDURE — 87653 STREP B DNA AMP PROBE: CPT | Performed by: OBSTETRICS & GYNECOLOGY

## 2020-07-07 ASSESSMENT — MIFFLIN-ST. JEOR: SCORE: 1632.16

## 2020-07-07 ASSESSMENT — PATIENT HEALTH QUESTIONNAIRE - PHQ9: SUM OF ALL RESPONSES TO PHQ QUESTIONS 1-9: 2

## 2020-07-07 NOTE — PROGRESS NOTES
GBS today and BSUS confirms cephalic. Having a boy. Updated about visitor guidelines currently.  Discussed weekly appointments until delivery and questions answered.  Not doing childbirth classes and probably planning epidural. BE

## 2020-07-08 LAB
GP B STREP DNA SPEC QL NAA+PROBE: NEGATIVE
SPECIMEN SOURCE: NORMAL

## 2020-07-09 ENCOUNTER — HOSPITAL ENCOUNTER (OUTPATIENT)
Facility: CLINIC | Age: 30
End: 2020-07-09
Payer: COMMERCIAL

## 2020-07-14 ENCOUNTER — PRENATAL OFFICE VISIT (OUTPATIENT)
Dept: OBGYN | Facility: CLINIC | Age: 30
End: 2020-07-14
Payer: COMMERCIAL

## 2020-07-14 VITALS
SYSTOLIC BLOOD PRESSURE: 117 MMHG | DIASTOLIC BLOOD PRESSURE: 76 MMHG | WEIGHT: 208.6 LBS | BODY MASS INDEX: 36.95 KG/M2 | HEART RATE: 73 BPM

## 2020-07-14 DIAGNOSIS — Z34.80 SUPERVISION OF OTHER NORMAL PREGNANCY, ANTEPARTUM: Primary | ICD-10-CM

## 2020-07-14 PROCEDURE — 99207 ZZC PRENATAL VISIT: CPT | Performed by: OBSTETRICS & GYNECOLOGY

## 2020-07-14 RX ORDER — AMOXICILLIN 250 MG
1 CAPSULE ORAL DAILY
Qty: 100 TABLET | Refills: 0 | COMMUNITY
Start: 2020-07-14 | End: 2021-09-01

## 2020-07-14 RX ORDER — NALOXONE HYDROCHLORIDE 0.4 MG/ML
.1-.4 INJECTION, SOLUTION INTRAMUSCULAR; INTRAVENOUS; SUBCUTANEOUS
Status: CANCELLED | OUTPATIENT
Start: 2020-07-14

## 2020-07-14 RX ORDER — ACETAMINOPHEN 325 MG/1
650 TABLET ORAL EVERY 4 HOURS PRN
Status: CANCELLED | OUTPATIENT
Start: 2020-07-14

## 2020-07-14 RX ORDER — OXYTOCIN 10 [USP'U]/ML
10 INJECTION, SOLUTION INTRAMUSCULAR; INTRAVENOUS
Status: CANCELLED | OUTPATIENT
Start: 2020-07-14

## 2020-07-14 RX ORDER — CITRIC ACID/SODIUM CITRATE 334-500MG
30 SOLUTION, ORAL ORAL ONCE
Status: CANCELLED | OUTPATIENT
Start: 2020-07-14 | End: 2020-07-14

## 2020-07-14 RX ORDER — METHYLERGONOVINE MALEATE 0.2 MG/ML
200 INJECTION INTRAVENOUS
Status: CANCELLED | OUTPATIENT
Start: 2020-07-14

## 2020-07-14 RX ORDER — ACETAMINOPHEN 325 MG/1
650 TABLET ORAL EVERY 6 HOURS PRN
Qty: 100 TABLET | Refills: 0 | COMMUNITY
Start: 2020-07-14

## 2020-07-14 RX ORDER — OXYTOCIN/0.9 % SODIUM CHLORIDE 30/500 ML
100-340 PLASTIC BAG, INJECTION (ML) INTRAVENOUS CONTINUOUS PRN
Status: CANCELLED | OUTPATIENT
Start: 2020-07-14

## 2020-07-14 RX ORDER — IBUPROFEN 200 MG
800 TABLET ORAL
Status: CANCELLED | OUTPATIENT
Start: 2020-07-14

## 2020-07-14 RX ORDER — OXYCODONE AND ACETAMINOPHEN 5; 325 MG/1; MG/1
1 TABLET ORAL
Status: CANCELLED | OUTPATIENT
Start: 2020-07-14

## 2020-07-14 RX ORDER — CARBOPROST TROMETHAMINE 250 UG/ML
250 INJECTION, SOLUTION INTRAMUSCULAR
Status: CANCELLED | OUTPATIENT
Start: 2020-07-14

## 2020-07-14 RX ORDER — IBUPROFEN 600 MG/1
600 TABLET, FILM COATED ORAL EVERY 6 HOURS PRN
Qty: 60 TABLET | Refills: 0 | COMMUNITY
Start: 2020-07-14 | End: 2020-09-29

## 2020-07-14 RX ORDER — FENTANYL CITRATE 50 UG/ML
50-100 INJECTION, SOLUTION INTRAMUSCULAR; INTRAVENOUS
Status: CANCELLED | OUTPATIENT
Start: 2020-07-14

## 2020-07-14 RX ORDER — ONDANSETRON 2 MG/ML
4 INJECTION INTRAMUSCULAR; INTRAVENOUS EVERY 6 HOURS PRN
Status: CANCELLED | OUTPATIENT
Start: 2020-07-14

## 2020-07-14 RX ORDER — SODIUM CHLORIDE, SODIUM LACTATE, POTASSIUM CHLORIDE, CALCIUM CHLORIDE 600; 310; 30; 20 MG/100ML; MG/100ML; MG/100ML; MG/100ML
INJECTION, SOLUTION INTRAVENOUS CONTINUOUS
Status: CANCELLED | OUTPATIENT
Start: 2020-07-14

## 2020-07-14 NOTE — PROGRESS NOTES
36w4d feeling ok, no c/o.  Some BH ctx.  Good fm.  Discussed GBS negative.  Has PP meds at home, will get ibuprofen.   Labor instructions and kick counts reviewed. RTC weekly  rachid

## 2020-07-21 ENCOUNTER — PRENATAL OFFICE VISIT (OUTPATIENT)
Dept: OBGYN | Facility: CLINIC | Age: 30
End: 2020-07-21
Payer: COMMERCIAL

## 2020-07-21 VITALS
WEIGHT: 209.4 LBS | HEART RATE: 94 BPM | SYSTOLIC BLOOD PRESSURE: 120 MMHG | OXYGEN SATURATION: 95 % | TEMPERATURE: 97.1 F | BODY MASS INDEX: 37.1 KG/M2 | DIASTOLIC BLOOD PRESSURE: 75 MMHG | HEIGHT: 63 IN

## 2020-07-21 DIAGNOSIS — Z34.80 SUPERVISION OF OTHER NORMAL PREGNANCY, ANTEPARTUM: Primary | ICD-10-CM

## 2020-07-21 PROCEDURE — 99207 ZZC PRENATAL VISIT: CPT | Performed by: OBSTETRICS & GYNECOLOGY

## 2020-07-21 ASSESSMENT — MIFFLIN-ST. JEOR: SCORE: 1633.96

## 2020-07-21 NOTE — PATIENT INSTRUCTIONS
Patient Education     Labor: Preparing for the Hospital    During the final weeks of your pregnancy, you may have irregular contractions. You may feel a drop in your baby s position. And the profile of your stomach may look a little different. Because due dates are not exact, have your bag packed and ready for the hospital.  Packing for the hospital  Here are some items you may want to have ready for the hospital:    A watch or clock with a second hand    Insurance card and identification    Nursing bra, nursing pads, and maternity underwear    Toiletries    Something to entertain yourself, like books or a handheld computer    Heavy socks or slippers and a robe    Clips for your hair, a brush, and lip balm    An [employer] or other music player    A camera or video camera and     Important phone numbers or email addresses    Going-home outfits for you and your baby    A car seat to take your baby home in  Leaving for the hospital  Follow the instructions you ve been given on when to leave for the hospital. You may be told to call your healthcare provider when it becomes hard to walk or talk during contractions or if your amniotic sac breaks (this causes a gush of water). If your partner makes the phone call for you, be nearby. That way, your healthcare provider can speak to you directly. Many women are told to go to the hospital after an hour of contractions that come 3 to 5 minutes apart. Leave sooner if the hospital is not nearby or is hard to get to.  Date Last Reviewed: 1/1/2018 2000-2019 [employer]. [address] [place] [address], [place] [address]. All rights reserved. This information is not intended as a substitute for professional medical care. Always follow your healthcare professional's instructions.

## 2020-07-21 NOTE — PROGRESS NOTES
"[facility]- OBGYN    CC: routine prenatal visit.    S: Patient reports she has been feeling well, mood is good.  Patient denies any painful contractions, vaginal bleeding, leakage of fluid.  She states she is feeling normal fetal movement.  She denies any changes in vision, chest pain, chest pressure, shortness of breath, nausea, vomiting, diarrhea.  Constipation improved with prune juice      O:   Patient Vitals for the past 24 hrs:   BP Temp Temp src Pulse SpO2 Height Weight   20 1052 120/75 97.1  F (36.2  C) Oral 94 95 % 1.6 m (5' 3\") 95 kg (209 lb 6.4 oz)   ]  Exam:  General- awake, alert, answering questions appropriately, appears comfortable  CV- regular rate  Lung- breathing comfortably on room air  Ext- bilateral lower extremities with trace edema    Doptones- 136 bpm    A&P: Nehal Wadsworth is a 30 year old  at 37w4d by LMP c/w 13w4d us who presents today for routine prenatal visit.     (Z34.80) Supervision of other normal pregnancy, antepartum  (primary encounter diagnosis)  Comment: Doing well.  Reviewed s/sx of labor.  Discussed labor and delivery expectations.    Plan: In person visit in on 2020    Caitlyn Montero MD      "

## 2020-07-28 ENCOUNTER — PRENATAL OFFICE VISIT (OUTPATIENT)
Dept: OBGYN | Facility: CLINIC | Age: 30
End: 2020-07-28
Payer: COMMERCIAL

## 2020-07-28 VITALS
DIASTOLIC BLOOD PRESSURE: 84 MMHG | HEART RATE: 76 BPM | SYSTOLIC BLOOD PRESSURE: 118 MMHG | WEIGHT: 210 LBS | BODY MASS INDEX: 37.2 KG/M2 | TEMPERATURE: 97.4 F

## 2020-07-28 DIAGNOSIS — Z34.80 SUPERVISION OF OTHER NORMAL PREGNANCY, ANTEPARTUM: Primary | ICD-10-CM

## 2020-07-28 PROCEDURE — 99207 ZZC PRENATAL VISIT: CPT | Performed by: OBSTETRICS & GYNECOLOGY

## 2020-07-28 NOTE — PROGRESS NOTES
[facility]- [facility]    CC: routine prenatal visit.    S:  Patient reports she has been feeling ok.  She is having some constipation issues and have been eating prunes, veggies, and increase water intake.  Patient denies any contractions, vaginal bleeding, leakage of fluid.      O:   Patient Vitals for the past 24 hrs:   BP Temp Temp src Pulse Weight   20 1137 118/84 97.4  F (36.3  C) Oral 76 95.3 kg (210 lb)   ]  Exam:  General- awake, alert, answering questions appropriately, appears comfortable  CV- regular rate  Lung- breathing comfortably on room air  Ext- bilateral lower extremities with 1+ edema    Doptones- 120 bpm    A&P: Nehal Wadsworth is a 30 year old  at 38w4d by LMP c/w 13w4d us who presents today for routine prenatal visit.    (Z34.80) Supervision of other normal pregnancy, antepartum  (primary encounter diagnosis)  Comment: Doing well.  Reviewed labor, pain control in labor, and delivery expectations.  Working on diet changes for constipation.  Discussed labor precautions.   Plan: Routine prenatal visit next 2020    Caitlyn Montero MD

## 2020-08-04 ENCOUNTER — PRENATAL OFFICE VISIT (OUTPATIENT)
Dept: OBGYN | Facility: CLINIC | Age: 30
End: 2020-08-04
Payer: COMMERCIAL

## 2020-08-04 VITALS
SYSTOLIC BLOOD PRESSURE: 135 MMHG | BODY MASS INDEX: 37.02 KG/M2 | OXYGEN SATURATION: 98 % | DIASTOLIC BLOOD PRESSURE: 79 MMHG | WEIGHT: 209 LBS | HEART RATE: 85 BPM

## 2020-08-04 DIAGNOSIS — Z34.80 SUPERVISION OF OTHER NORMAL PREGNANCY, ANTEPARTUM: Primary | ICD-10-CM

## 2020-08-04 PROCEDURE — 99207 ZZC PRENATAL VISIT: CPT | Performed by: OBSTETRICS & GYNECOLOGY

## 2020-08-04 NOTE — PATIENT INSTRUCTIONS
Patient Education     Pregnancy and Childbirth: What to Bring to the Hospital    You re likely feeling anxious as your child s birth approaches. This is normal. To give yourself some peace of mind, pack a bag ahead of time. Do this about one month ahead of your estimated delivery date. Here is a list of things to remember:    Personal care items, like a toothbrush, hair brush, lip balm, lotion, and shampoo    Eyeglasses (if you wear them)    Nightgown (if you plan to breastfeed, pack one that allows for nursing)    Nursing bra if you plan to breastfeed    Bathrobe and slippers    Many hospitals provide maternity underwear, but you may want to bring underwear that can be soiled because you will have bleeding after delivery    Comfortable clothes for you to wear home, like sweat pants, yoga pants, or other stretchable clothes, because your prepregnancy clothes may not fit after delivery of your baby    Clothes for your baby to wear home    Personal music player and headphones    Camera with new batteries or     Coins for vending machines    Telephone numbers of people to call after the birth    Cell phone and     Insurance information and any other paperwork needed for your hospital stay    A list of baby names you are considering    An infant, rear-facing car seat for bringing home your baby (this is required by law)  Add anything else that you don t want to forget:  _____________________________________  _____________________________________  _____________________________________  _____________________________________  _____________________________________  _____________________________________  _____________________________________  Date Last Reviewed: 12/1/2017 2000-2019 [employer]. [address] [place] [id number]. All rights reserved. This information is not intended as a substitute for professional medical care. Always follow your healthcare professional's  instructions.

## 2020-08-04 NOTE — PROGRESS NOTES
[facility]- OBGYN    CC: routine prenatal visit.    S:  Patient reports she has been feeling better than last week.  She was finally able to have a bowel movement after 8 days of constipation and has continued having bowel movements with daily miralax.  Patient denies any contractions, vaginal bleeding, leakage of fluid.  She is feeling normal fetal movement.    O:   Patient Vitals for the past 24 hrs:   BP Pulse SpO2 Weight   20 1100 135/79 85 98 % 94.8 kg (209 lb)   ]  Exam:  General- awake, alert, answering questions appropriately, appears comfortable  CV- regular rate  Lung- breathing comfortably on room air  Ext- bilateral lower extremities with trace edema    Doptones- 142 bpm    A&P: Nehal Wadsworth is a 30 year old  at 39w4d by LMP c/w 13w4d us who presents today for routine prenatal visit.    (Z34.80) Supervision of other normal pregnancy, antepartum  (primary encounter diagnosis)  Comment: Doing well.  Discussed induction of labor at 41weeks if no spontaneous labor by then.    Plan: Routine prenatal visit in one week    Caitlyn Montero MD

## 2020-08-11 ENCOUNTER — PRENATAL OFFICE VISIT (OUTPATIENT)
Dept: OBGYN | Facility: CLINIC | Age: 30
End: 2020-08-11
Payer: COMMERCIAL

## 2020-08-11 VITALS
OXYGEN SATURATION: 98 % | BODY MASS INDEX: 37.87 KG/M2 | WEIGHT: 213.8 LBS | SYSTOLIC BLOOD PRESSURE: 117 MMHG | HEART RATE: 89 BPM | DIASTOLIC BLOOD PRESSURE: 78 MMHG | TEMPERATURE: 97.7 F

## 2020-08-11 DIAGNOSIS — Z34.80 SUPERVISION OF OTHER NORMAL PREGNANCY, ANTEPARTUM: Primary | ICD-10-CM

## 2020-08-11 LAB
LABORATORY COMMENT REPORT: NORMAL
SARS-COV-2 RNA SPEC QL NAA+PROBE: NEGATIVE
SARS-COV-2 RNA SPEC QL NAA+PROBE: NORMAL
SPECIMEN SOURCE: NORMAL
SPECIMEN SOURCE: NORMAL

## 2020-08-11 PROCEDURE — U0003 INFECTIOUS AGENT DETECTION BY NUCLEIC ACID (DNA OR RNA); SEVERE ACUTE RESPIRATORY SYNDROME CORONAVIRUS 2 (SARS-COV-2) (CORONAVIRUS DISEASE [COVID-19]), AMPLIFIED PROBE TECHNIQUE, MAKING USE OF HIGH THROUGHPUT TECHNOLOGIES AS DESCRIBED BY CMS-2020-01-R: HCPCS | Performed by: OBSTETRICS & GYNECOLOGY

## 2020-08-11 PROCEDURE — 99207 ZZC PRENATAL VISIT: CPT | Performed by: OBSTETRICS & GYNECOLOGY

## 2020-08-11 NOTE — PROGRESS NOTES
Was really hoping to be delivered by now. very uncomfortable with cervical exam and ?closed. Discussed IOL at 41 weeks and covid test done today. Will do nitrous and cervical exam, discussed possible cytotec, mims and pitocin. EFW almost 8 lbs. Questions answered. BE

## 2020-08-14 ENCOUNTER — ANESTHESIA EVENT (OUTPATIENT)
Dept: OBGYN | Facility: CLINIC | Age: 30
End: 2020-08-14
Payer: COMMERCIAL

## 2020-08-14 ENCOUNTER — ANESTHESIA (OUTPATIENT)
Dept: OBGYN | Facility: CLINIC | Age: 30
End: 2020-08-14
Payer: COMMERCIAL

## 2020-08-14 ENCOUNTER — HOSPITAL ENCOUNTER (INPATIENT)
Facility: CLINIC | Age: 30
LOS: 2 days | Discharge: HOME HEALTH | End: 2020-08-16
Attending: OBSTETRICS & GYNECOLOGY | Admitting: OBSTETRICS & GYNECOLOGY
Payer: COMMERCIAL

## 2020-08-14 LAB
ABO + RH BLD: NORMAL
ABO + RH BLD: NORMAL
BASOPHILS # BLD AUTO: 0 10E9/L (ref 0–0.2)
BASOPHILS NFR BLD AUTO: 0.2 %
BLD GP AB SCN SERPL QL: NORMAL
BLOOD BANK CMNT PATIENT-IMP: NORMAL
DIFFERENTIAL METHOD BLD: ABNORMAL
EOSINOPHIL # BLD AUTO: 0.1 10E9/L (ref 0–0.7)
EOSINOPHIL NFR BLD AUTO: 0.7 %
ERYTHROCYTE [DISTWIDTH] IN BLOOD BY AUTOMATED COUNT: 13.7 % (ref 10–15)
HCT VFR BLD AUTO: 33.8 % (ref 35–47)
HGB BLD-MCNC: 11.4 G/DL (ref 11.7–15.7)
IMM GRANULOCYTES # BLD: 0.1 10E9/L (ref 0–0.4)
IMM GRANULOCYTES NFR BLD: 1 %
LYMPHOCYTES # BLD AUTO: 2.1 10E9/L (ref 0.8–5.3)
LYMPHOCYTES NFR BLD AUTO: 25.7 %
MCH RBC QN AUTO: 32.1 PG (ref 26.5–33)
MCHC RBC AUTO-ENTMCNC: 33.7 G/DL (ref 31.5–36.5)
MCV RBC AUTO: 95 FL (ref 78–100)
MONOCYTES # BLD AUTO: 0.6 10E9/L (ref 0–1.3)
MONOCYTES NFR BLD AUTO: 7.7 %
NEUTROPHILS # BLD AUTO: 5.4 10E9/L (ref 1.6–8.3)
NEUTROPHILS NFR BLD AUTO: 64.7 %
NRBC # BLD AUTO: 0 10*3/UL
NRBC BLD AUTO-RTO: 0 /100
PLATELET # BLD AUTO: 283 10E9/L (ref 150–450)
RBC # BLD AUTO: 3.55 10E12/L (ref 3.8–5.2)
SPECIMEN EXP DATE BLD: NORMAL
T PALLIDUM AB SER QL: NONREACTIVE
WBC # BLD AUTO: 8.3 10E9/L (ref 4–11)

## 2020-08-14 PROCEDURE — 86901 BLOOD TYPING SEROLOGIC RH(D): CPT | Performed by: OBSTETRICS & GYNECOLOGY

## 2020-08-14 PROCEDURE — 25000125 ZZHC RX 250: Performed by: STUDENT IN AN ORGANIZED HEALTH CARE EDUCATION/TRAINING PROGRAM

## 2020-08-14 PROCEDURE — 40000275 ZZH STATISTIC RCP TIME EA 10 MIN

## 2020-08-14 PROCEDURE — 0KQM0ZZ REPAIR PERINEUM MUSCLE, OPEN APPROACH: ICD-10-PCS | Performed by: OBSTETRICS & GYNECOLOGY

## 2020-08-14 PROCEDURE — 36415 COLL VENOUS BLD VENIPUNCTURE: CPT | Performed by: OBSTETRICS & GYNECOLOGY

## 2020-08-14 PROCEDURE — 12000001 ZZH R&B MED SURG/OB UMMC

## 2020-08-14 PROCEDURE — 25000128 H RX IP 250 OP 636: Performed by: STUDENT IN AN ORGANIZED HEALTH CARE EDUCATION/TRAINING PROGRAM

## 2020-08-14 PROCEDURE — 25000132 ZZH RX MED GY IP 250 OP 250 PS 637: Performed by: OBSTETRICS & GYNECOLOGY

## 2020-08-14 PROCEDURE — 40000977 ZZH STATISTIC ATTENDANCE AT DELIVERY

## 2020-08-14 PROCEDURE — 86780 TREPONEMA PALLIDUM: CPT | Performed by: OBSTETRICS & GYNECOLOGY

## 2020-08-14 PROCEDURE — 00HU33Z INSERTION OF INFUSION DEVICE INTO SPINAL CANAL, PERCUTANEOUS APPROACH: ICD-10-PCS | Performed by: STUDENT IN AN ORGANIZED HEALTH CARE EDUCATION/TRAINING PROGRAM

## 2020-08-14 PROCEDURE — 3E0P7VZ INTRODUCTION OF HORMONE INTO FEMALE REPRODUCTIVE, VIA NATURAL OR ARTIFICIAL OPENING: ICD-10-PCS | Performed by: OBSTETRICS & GYNECOLOGY

## 2020-08-14 PROCEDURE — 59400 OBSTETRICAL CARE: CPT | Mod: GC | Performed by: OBSTETRICS & GYNECOLOGY

## 2020-08-14 PROCEDURE — 25800030 ZZH RX IP 258 OP 636: Performed by: OBSTETRICS & GYNECOLOGY

## 2020-08-14 PROCEDURE — 3E0R3BZ INTRODUCTION OF ANESTHETIC AGENT INTO SPINAL CANAL, PERCUTANEOUS APPROACH: ICD-10-PCS | Performed by: STUDENT IN AN ORGANIZED HEALTH CARE EDUCATION/TRAINING PROGRAM

## 2020-08-14 PROCEDURE — 86900 BLOOD TYPING SEROLOGIC ABO: CPT | Performed by: OBSTETRICS & GYNECOLOGY

## 2020-08-14 PROCEDURE — 86850 RBC ANTIBODY SCREEN: CPT | Performed by: OBSTETRICS & GYNECOLOGY

## 2020-08-14 PROCEDURE — 25000125 ZZHC RX 250: Performed by: OBSTETRICS & GYNECOLOGY

## 2020-08-14 PROCEDURE — 85025 COMPLETE CBC W/AUTO DIFF WBC: CPT | Performed by: OBSTETRICS & GYNECOLOGY

## 2020-08-14 RX ORDER — EPHEDRINE SULFATE 50 MG/ML
5 INJECTION, SOLUTION INTRAMUSCULAR; INTRAVENOUS; SUBCUTANEOUS
Status: DISCONTINUED | OUTPATIENT
Start: 2020-08-14 | End: 2020-08-15

## 2020-08-14 RX ORDER — ACETAMINOPHEN 325 MG/1
650 TABLET ORAL EVERY 4 HOURS PRN
Status: DISCONTINUED | OUTPATIENT
Start: 2020-08-14 | End: 2020-08-14

## 2020-08-14 RX ORDER — OXYCODONE AND ACETAMINOPHEN 5; 325 MG/1; MG/1
1 TABLET ORAL
Status: DISCONTINUED | OUTPATIENT
Start: 2020-08-14 | End: 2020-08-14

## 2020-08-14 RX ORDER — MISOPROSTOL 100 UG/1
25 TABLET ORAL EVERY 4 HOURS PRN
Status: DISCONTINUED | OUTPATIENT
Start: 2020-08-14 | End: 2020-08-14

## 2020-08-14 RX ORDER — OXYTOCIN/0.9 % SODIUM CHLORIDE 30/500 ML
PLASTIC BAG, INJECTION (ML) INTRAVENOUS
Status: DISCONTINUED
Start: 2020-08-14 | End: 2020-08-15 | Stop reason: HOSPADM

## 2020-08-14 RX ORDER — NALOXONE HYDROCHLORIDE 0.4 MG/ML
.1-.4 INJECTION, SOLUTION INTRAMUSCULAR; INTRAVENOUS; SUBCUTANEOUS
Status: DISCONTINUED | OUTPATIENT
Start: 2020-08-14 | End: 2020-08-16 | Stop reason: HOSPADM

## 2020-08-14 RX ORDER — MISOPROSTOL 200 UG/1
TABLET ORAL
Status: DISCONTINUED
Start: 2020-08-14 | End: 2020-08-15 | Stop reason: WASHOUT

## 2020-08-14 RX ORDER — OXYTOCIN 10 [USP'U]/ML
INJECTION, SOLUTION INTRAMUSCULAR; INTRAVENOUS
Status: DISCONTINUED
Start: 2020-08-14 | End: 2020-08-15 | Stop reason: WASHOUT

## 2020-08-14 RX ORDER — AMOXICILLIN 250 MG
2 CAPSULE ORAL 2 TIMES DAILY
Status: DISCONTINUED | OUTPATIENT
Start: 2020-08-14 | End: 2020-08-16 | Stop reason: HOSPADM

## 2020-08-14 RX ORDER — CARBOPROST TROMETHAMINE 250 UG/ML
250 INJECTION, SOLUTION INTRAMUSCULAR
Status: DISCONTINUED | OUTPATIENT
Start: 2020-08-14 | End: 2020-08-14

## 2020-08-14 RX ORDER — ACETAMINOPHEN 325 MG/1
650 TABLET ORAL EVERY 4 HOURS PRN
Status: DISCONTINUED | OUTPATIENT
Start: 2020-08-14 | End: 2020-08-16 | Stop reason: HOSPADM

## 2020-08-14 RX ORDER — SODIUM CHLORIDE, SODIUM LACTATE, POTASSIUM CHLORIDE, CALCIUM CHLORIDE 600; 310; 30; 20 MG/100ML; MG/100ML; MG/100ML; MG/100ML
INJECTION, SOLUTION INTRAVENOUS CONTINUOUS
Status: DISCONTINUED | OUTPATIENT
Start: 2020-08-14 | End: 2020-08-14

## 2020-08-14 RX ORDER — OXYTOCIN/0.9 % SODIUM CHLORIDE 30/500 ML
340 PLASTIC BAG, INJECTION (ML) INTRAVENOUS CONTINUOUS PRN
Status: DISCONTINUED | OUTPATIENT
Start: 2020-08-14 | End: 2020-08-16 | Stop reason: HOSPADM

## 2020-08-14 RX ORDER — AMOXICILLIN 250 MG
1 CAPSULE ORAL 2 TIMES DAILY
Status: DISCONTINUED | OUTPATIENT
Start: 2020-08-14 | End: 2020-08-16 | Stop reason: HOSPADM

## 2020-08-14 RX ORDER — CITRIC ACID/SODIUM CITRATE 334-500MG
30 SOLUTION, ORAL ORAL ONCE
Status: DISCONTINUED | OUTPATIENT
Start: 2020-08-14 | End: 2020-08-14

## 2020-08-14 RX ORDER — OXYTOCIN/0.9 % SODIUM CHLORIDE 30/500 ML
100-340 PLASTIC BAG, INJECTION (ML) INTRAVENOUS CONTINUOUS PRN
Status: COMPLETED | OUTPATIENT
Start: 2020-08-14 | End: 2020-08-14

## 2020-08-14 RX ORDER — OXYTOCIN 10 [USP'U]/ML
10 INJECTION, SOLUTION INTRAMUSCULAR; INTRAVENOUS
Status: DISCONTINUED | OUTPATIENT
Start: 2020-08-14 | End: 2020-08-14

## 2020-08-14 RX ORDER — ONDANSETRON 2 MG/ML
4 INJECTION INTRAMUSCULAR; INTRAVENOUS EVERY 6 HOURS PRN
Status: DISCONTINUED | OUTPATIENT
Start: 2020-08-14 | End: 2020-08-14

## 2020-08-14 RX ORDER — NALBUPHINE HYDROCHLORIDE 20 MG/ML
2.5-5 INJECTION, SOLUTION INTRAMUSCULAR; INTRAVENOUS; SUBCUTANEOUS EVERY 6 HOURS PRN
Status: DISCONTINUED | OUTPATIENT
Start: 2020-08-14 | End: 2020-08-15

## 2020-08-14 RX ORDER — IBUPROFEN 800 MG/1
800 TABLET, FILM COATED ORAL EVERY 6 HOURS PRN
Status: DISCONTINUED | OUTPATIENT
Start: 2020-08-14 | End: 2020-08-16 | Stop reason: HOSPADM

## 2020-08-14 RX ORDER — EPHEDRINE SULFATE 50 MG/ML
INJECTION, SOLUTION INTRAMUSCULAR; INTRAVENOUS; SUBCUTANEOUS
Status: DISCONTINUED
Start: 2020-08-14 | End: 2020-08-15 | Stop reason: HOSPADM

## 2020-08-14 RX ORDER — METHYLERGONOVINE MALEATE 0.2 MG/ML
200 INJECTION INTRAVENOUS
Status: DISCONTINUED | OUTPATIENT
Start: 2020-08-14 | End: 2020-08-14

## 2020-08-14 RX ORDER — NALOXONE HYDROCHLORIDE 0.4 MG/ML
.1-.4 INJECTION, SOLUTION INTRAMUSCULAR; INTRAVENOUS; SUBCUTANEOUS
Status: DISCONTINUED | OUTPATIENT
Start: 2020-08-14 | End: 2020-08-15

## 2020-08-14 RX ORDER — FENTANYL CITRATE 50 UG/ML
50-100 INJECTION, SOLUTION INTRAMUSCULAR; INTRAVENOUS
Status: DISCONTINUED | OUTPATIENT
Start: 2020-08-14 | End: 2020-08-14

## 2020-08-14 RX ORDER — BUPIVACAINE HYDROCHLORIDE 2.5 MG/ML
INJECTION, SOLUTION EPIDURAL; INFILTRATION; INTRACAUDAL PRN
Status: DISCONTINUED | OUTPATIENT
Start: 2020-08-14 | End: 2020-08-15 | Stop reason: HOSPADM

## 2020-08-14 RX ORDER — BISACODYL 10 MG
10 SUPPOSITORY, RECTAL RECTAL DAILY PRN
Status: DISCONTINUED | OUTPATIENT
Start: 2020-08-16 | End: 2020-08-16 | Stop reason: HOSPADM

## 2020-08-14 RX ORDER — LIDOCAINE HYDROCHLORIDE AND EPINEPHRINE 15; 5 MG/ML; UG/ML
INJECTION, SOLUTION EPIDURAL PRN
Status: DISCONTINUED | OUTPATIENT
Start: 2020-08-14 | End: 2020-08-15 | Stop reason: HOSPADM

## 2020-08-14 RX ORDER — NALOXONE HYDROCHLORIDE 0.4 MG/ML
.1-.4 INJECTION, SOLUTION INTRAMUSCULAR; INTRAVENOUS; SUBCUTANEOUS
Status: DISCONTINUED | OUTPATIENT
Start: 2020-08-14 | End: 2020-08-14

## 2020-08-14 RX ORDER — HYDROCORTISONE 2.5 %
CREAM (GRAM) TOPICAL 3 TIMES DAILY PRN
Status: DISCONTINUED | OUTPATIENT
Start: 2020-08-14 | End: 2020-08-16 | Stop reason: HOSPADM

## 2020-08-14 RX ORDER — MODIFIED LANOLIN
OINTMENT (GRAM) TOPICAL
Status: DISCONTINUED | OUTPATIENT
Start: 2020-08-14 | End: 2020-08-16 | Stop reason: HOSPADM

## 2020-08-14 RX ORDER — OXYTOCIN 10 [USP'U]/ML
10 INJECTION, SOLUTION INTRAMUSCULAR; INTRAVENOUS
Status: DISCONTINUED | OUTPATIENT
Start: 2020-08-14 | End: 2020-08-16 | Stop reason: HOSPADM

## 2020-08-14 RX ORDER — LIDOCAINE HYDROCHLORIDE 10 MG/ML
INJECTION, SOLUTION EPIDURAL; INFILTRATION; INTRACAUDAL; PERINEURAL
Status: DISCONTINUED
Start: 2020-08-14 | End: 2020-08-15 | Stop reason: HOSPADM

## 2020-08-14 RX ORDER — IBUPROFEN 800 MG/1
800 TABLET, FILM COATED ORAL
Status: DISCONTINUED | OUTPATIENT
Start: 2020-08-14 | End: 2020-08-14

## 2020-08-14 RX ORDER — OXYTOCIN/0.9 % SODIUM CHLORIDE 30/500 ML
100 PLASTIC BAG, INJECTION (ML) INTRAVENOUS CONTINUOUS
Status: DISCONTINUED | OUTPATIENT
Start: 2020-08-14 | End: 2020-08-16 | Stop reason: HOSPADM

## 2020-08-14 RX ADMIN — BUPIVACAINE HYDROCHLORIDE 8 ML: 2.5 INJECTION, SOLUTION EPIDURAL; INFILTRATION; INTRACAUDAL at 17:55

## 2020-08-14 RX ADMIN — SODIUM CHLORIDE, POTASSIUM CHLORIDE, SODIUM LACTATE AND CALCIUM CHLORIDE: 600; 310; 30; 20 INJECTION, SOLUTION INTRAVENOUS at 14:34

## 2020-08-14 RX ADMIN — Medication: at 18:40

## 2020-08-14 RX ADMIN — Medication 340 ML/HR: at 22:48

## 2020-08-14 RX ADMIN — Medication 10 ML/HR: at 17:56

## 2020-08-14 RX ADMIN — Medication 25 MCG: at 11:19

## 2020-08-14 RX ADMIN — SODIUM CHLORIDE, POTASSIUM CHLORIDE, SODIUM LACTATE AND CALCIUM CHLORIDE: 600; 310; 30; 20 INJECTION, SOLUTION INTRAVENOUS at 16:35

## 2020-08-14 RX ADMIN — LIDOCAINE HYDROCHLORIDE,EPINEPHRINE BITARTRATE 3 ML: 15; .005 INJECTION, SOLUTION EPIDURAL; INFILTRATION; INTRACAUDAL; PERINEURAL at 17:50

## 2020-08-14 RX ADMIN — SODIUM CHLORIDE, POTASSIUM CHLORIDE, SODIUM LACTATE AND CALCIUM CHLORIDE: 600; 310; 30; 20 INJECTION, SOLUTION INTRAVENOUS at 19:49

## 2020-08-14 RX ADMIN — LIDOCAINE HYDROCHLORIDE 20 ML: 10 INJECTION, SOLUTION EPIDURAL; INFILTRATION; INTRACAUDAL; PERINEURAL at 22:53

## 2020-08-14 ASSESSMENT — MIFFLIN-ST. JEOR: SCORE: 1650.29

## 2020-08-14 NOTE — H&P
[facility], [place]    History and Physical  Obstetrics and Gynecology     Date of Admission:  2020    Assessment & Plan   Nehal Wadsworth is a 30 year old female who presents with postdates.  ASSESSMENT:   IUP @ 41w0d for induction of labor.  Indication postdates.  NST reactive.  Category  I    PLAN:   Admit - see IP orders  cervical ripening with misoprostol     SHANTI NETTLES    History of Present Illness   Nehal Wadsworth is a 30 year old female  41w0d  Estimated Date of Delivery: Aug 7, 2020 is calculated from Patient's last menstrual period was 2019. [facility]  for [facility] of [facility].  Indication postadtes.    PRENATAL COURSE  Prenatal course was essentially uncomplicated      Recent Labs   Lab Test 20  1004 20  0849   ABO PENDING A   RH  --  Pos   AS PENDING Neg     Rhogam not indicated   Recent Labs   Lab Test 20  1130 20  0849   HEPBANG  --  Nonreactive   HIAGAB  --  Nonreactive   GBS Negative  --    RUQIGG  --  17         Prior to Admission Medications   Prior to Admission Medications   Prescriptions Last Dose Informant Patient Reported? Taking?   Ferrous Sulfate (IRON PO) 2020 at Unknown time  Yes Yes   Sig: Take 45 mg by mouth Taking every other day   Prenatal Vit-Fe Fumarate-FA (PRENATAL 19) CHEW 2020 at Unknown time  Yes Yes   acetaminophen (TYLENOL) 325 MG tablet Unknown at Unknown time  No No   Sig: Take 2 tablets (650 mg) by mouth every 6 hours as needed for mild pain Start after Delivery.   ibuprofen (ADVIL/MOTRIN) 600 MG tablet Unknown at Unknown time  No No   Sig: Take 1 tablet (600 mg) by mouth every 6 hours as needed for moderate pain Start after delivery   Patient not taking: Reported on 2020   senna-docusate (SENOKOT-S/PERICOLACE) 8.6-50 MG tablet Unknown at Unknown time  No No   Sig: Take 1 tablet by mouth daily Start after delivery.      Facility-Administered Medications: None      Allergies   Allergies   Allergen Reactions     No Clinical Screening - See Comments      MMR-got lazy eye.         Immunization History   Immunization History   Administered Date(s) Administered     TDAP Vaccine (Adacel) 05/12/2020       Physical Exam   Temp: 98.2  F (36.8  C) Temp src: Oral BP: 131/78     Resp: 18 SpO2: 96 %      Vital Signs with Ranges  Temp:  [98.2  F (36.8  C)] 98.2  F (36.8  C)  Resp:  [18] 18  BP: (131)/(78) 131/78  SpO2:  [96 %] 96 %    Abdomen: gravid, single vertex fetus, non-tender, EFW 7 lbs 10  Cervical Exam: closed/ 50/ Posterior/ average/ -2     Fetal Heart Tones: 150 baseline, moderate variablility, + accels, no decels and Category I  TOCO:   external monitor and occasional    Constitutional: healthy, alert, active and no distress   Extremities: NT, no edema  Neurologic: Awake, alert, oriented x3  Neuropsychiatric: General: normal, calm and normal eye contact    SHANTI NETTLES MD

## 2020-08-14 NOTE — ANESTHESIA PREPROCEDURE EVALUATION
"Anesthesia Pre-Procedure Evaluation    Patient: Nehal Wadsworth   MRN:     [id number] Gender:   female   Age:    30 year old :      1990        Preoperative Diagnosis: * No pre-op diagnosis entered *   * No procedures listed *     LABS:  CBC:   Lab Results   Component Value Date    WBC 8.3 2020    WBC 9.8 2020    HGB 11.4 (L) 2020    HGB 10.5 (L) 2020    HCT 33.8 (L) 2020    HCT 34.8 (L) 2020     2020     2020     BMP: No results found for: NA, POTASSIUM, CHLORIDE, CO2, BUN, CR, GLC  COAGS: No results found for: PTT, INR, FIBR  POC: No results found for: BGM, HCG, HCGS  OTHER:   Lab Results   Component Value Date    A1C 5.0 2020        Preop Vitals    BP Readings from Last 3 Encounters:   20 125/69   20 117/78   20 135/79    Pulse Readings from Last 3 Encounters:   20 89   20 85   20 76      Resp Readings from Last 3 Encounters:   20 18    SpO2 Readings from Last 3 Encounters:   20 96%   20 98%   20 98%      Temp Readings from Last 1 Encounters:   20 36.8  C (98.2  F) (Oral)    Ht Readings from Last 1 Encounters:   20 1.6 m (5' 3\")      Wt Readings from Last 1 Encounters:   20 96.6 kg (213 lb)    Estimated body mass index is 37.73 kg/m  as calculated from the following:    Height as of this encounter: 1.6 m (5' 3\").    Weight as of this encounter: 96.6 kg (213 lb).     LDA:  Peripheral IV 20 Left;Dorsal Lower forearm (Active)   Site Assessment WDL 20 1443   Line Status Infusing 20 1443   Phlebitis Scale 0-->no symptoms 20 1443   Infiltration Scale 0 20 1443   Number of days: 0       Intrathecal/Epidural Catheter 20 (Active)   Number of days: 0        History reviewed. No pertinent past medical history.   Past Surgical History:   Procedure Laterality Date     EYE SURGERY      lazy eye correction     HC TOOTH EXTRACTION W/FORCEP  " 2008      Allergies   Allergen Reactions     No Clinical Screening - See Comments      MMR-got lazy eye.        Anesthesia Evaluation       history and physical reviewed . Pt has had prior anesthetic. Type: Regional    No history of anesthetic complications          ROS/MED HX    ENT/Pulmonary:  - neg pulmonary ROS     Neurologic:  - neg neurologic ROS     Cardiovascular:  - neg cardiovascular ROS       METS/Exercise Tolerance:     Hematologic:         Musculoskeletal:         GI/Hepatic:  - neg GI/hepatic ROS       Renal/Genitourinary:         Endo:         Psychiatric:         Infectious Disease:         Malignancy:         Other:                     JZG FV AN PHYSICAL EXAM    Assessment:   ASA SCORE: 2 emergent   H&P: History and physical reviewed and following examination; no interval change.    NPO Status: NPO Appropriate     Plan:   Anes. Type:  Epidural     Epidural Details:  Catheter; Lumbar   Pre-Medication: None   Induction:  N/a   Airway: Native Airway   Access/Monitoring: PIV   Maintenance: N/a     Postop Plan:   Postop Pain: Regional  Postop Sedation/Airway: Not planned  Disposition: Inpatient/Admit     PONV Management: Adult Risk Factors: Female     CONSENT: Direct conversation   Plan and risks discussed with: Patient                neg OB ROS             Kwan Westfall DO

## 2020-08-14 NOTE — ANESTHESIA PROCEDURE NOTES
Epidural Procedure Note  Staff -   Anesthesiologist:  Gregory Vazquez MD  Resident/Fellow: Kwan Westfall DO    Performed By: resident  Procedure performed by resident/CRNA in presence of a teaching physician.          Location: OB     Procedure start time:  8/14/2020 5:40 PM     Procedure end time:  8/14/2020 5:57 PM   Pre-procedure checklist:   patient identified, IV checked, site marked, risks and benefits discussed, informed consent, monitors and equipment checked, pre-op evaluation, at physician/surgeon's request and post-op pain management      Correct Patient: Yes      Correct Position: Yes      Correct Site: Yes      Correct Procedure: Yes      Correct Laterality:  Yes    Site Marked:  Yes  Procedure:     Procedure:  Epidural catheter    ASA:  2    Diagnosis:  Labor analgesia    Position:  Sitting    Sterile Prep: chloraprep      Insertion site:  L3-4    Local skin infiltration:  1% lidocaine    amount (mL):  3    Approach:  Midline    Needle gauge (G):  17    Needle Length (in):  3.5    Block Needle Type:  Touhy    Injection Technique:  LORT saline    INESSA at (cm):  8    Attempts:  2    Redirects:  2    Catheter gauge (G):  19    Catheter threaded easily: Yes      Threaded to cm at skin:  12    Threaded in epidural space (cm):  4    Paresthesias:  No    Aspiration negative for Heme or CSF: Yes       Local anesthetic:  Lidocaine 1.5% w/ 1:200,000 epinephrine    Test dose time:  17:50  Assessment/Narrative:      First attempt with one redirect at L4-L5. Second attempt at L3-L4 successful.

## 2020-08-14 NOTE — PROGRESS NOTES
Late entry    BSUS with cephalic presentation  NST reactive  miso 25 mcg placed in posterior fornix 1120    Carol Durant MD     Left ankle fracture Left ankle fracture

## 2020-08-14 NOTE — PROVIDER NOTIFICATION
08/14/20 1443   Provider Notification   Provider Name/Title Dr. Durant & Dr. Hackett   Method of Notification In Department   Request Evaluate - Remote   Notification Reason Uterine Activity;Status Update   Tachysystole management. Providers updated on SROM and EFM strip review. Fluid bolus started, position change.

## 2020-08-14 NOTE — PROGRESS NOTES
Subjective:   Contractions: comfortable with epidural  Leakage of fluids: clear        Objective:  Patient Vitals for the past 8 hrs:   BP Temp Temp src Resp   08/14/20 1315 125/69 98.2  F (36.8  C) Oral 18     Cervix: 5/90/-2    EFM:   150 baseline, moderate variablility, + accels, no decels, Category I  Tocometer: external monitor and frequency q 2 minutes    Assessment:/Plan:   Labor: induced with cytotec for 1 doses  Recheck cervix for progress in 3-4 hours. No questions.    SHANTI NETTLES MD

## 2020-08-14 NOTE — PROGRESS NOTES
"Labor Progress Note    S:  Patient coping well.    O:   Patient Vitals for the past 24 hrs:   BP Temp Temp src Resp SpO2 Height Weight   20 1022 131/78 98.2  F (36.8  C) Oral 18 96 % 1.6 m (5' 3\") 96.6 kg (213 lb)      SVE: Deferred, just heard that patient had SROM for clear fluid    FHT: Baseline 145bm, moderate variability, positive accelerations, no decelerations  [place]: q1-2min contractions, though still comfortable    A/P:  Ms. Nehal Wadsworth is a 30 year old  at 41w0d, admitted for Induction of labor for postdates.    1. Labor: Pt is s/p 1 dose vaginal misoprostol but is adriana too frequently for more, she is now s/p SROM will continue with expectant management. PRN SVE and mims vs pitocin if cervix not changing   2. Rh positive  3. GBS neg  4. Fetus: Category 1 FHT, continue EFM and tocometry.  5. Prenatal hx:  1. Rubella immune    Patient discussed with Dr Bhargav Carey MD  Obstetrics and Gyncology, PGY-4  [phone] (OBGYN PGY2 Pager)    "

## 2020-08-14 NOTE — PROGRESS NOTES
"Subjective:   Contractions: using nitrous  Leakage of fluids: clear        Objective:  Patient Vitals for the past 8 hrs:   BP Temp Temp src Resp SpO2 Height Weight   08/14/20 1315 125/69 98.2  F (36.8  C) Oral 18 -- -- --   08/14/20 1022 131/78 98.2  F (36.8  C) Oral 18 96 % 1.6 m (5' 3\") 96.6 kg (213 lb)     Cervix: not checked  Membranes: SROM  clear amniotic fluid    EFM:   120 baseline, moderate variablility, + accels, no decels, Category I  Tocometer: external monitor and frequency q 1-2 minutes    Assessment:/Plan:   Labor: induced with cytotec for 1 doses  Will get epidural now for pain and then check cervix. Laboring now on her own since miso was placed at 1120 this am. Discussed with spouse and pt, has had 2 fluid bolus and still tachysystole but cat 1 strip.    SHANTI NETTLES MD    "

## 2020-08-15 LAB
ALT SERPL W P-5'-P-CCNC: 13 U/L (ref 0–50)
AST SERPL W P-5'-P-CCNC: 21 U/L (ref 0–45)
CREAT SERPL-MCNC: 0.63 MG/DL (ref 0.52–1.04)
ERYTHROCYTE [DISTWIDTH] IN BLOOD BY AUTOMATED COUNT: 13.7 % (ref 10–15)
GFR SERPL CREATININE-BSD FRML MDRD: >90 ML/MIN/{1.73_M2}
HCT VFR BLD AUTO: 31.7 % (ref 35–47)
HGB BLD-MCNC: 10.7 G/DL (ref 11.7–15.7)
MCH RBC QN AUTO: 32.1 PG (ref 26.5–33)
MCHC RBC AUTO-ENTMCNC: 33.8 G/DL (ref 31.5–36.5)
MCV RBC AUTO: 95 FL (ref 78–100)
PLATELET # BLD AUTO: 229 10E9/L (ref 150–450)
RBC # BLD AUTO: 3.33 10E12/L (ref 3.8–5.2)
WBC # BLD AUTO: 17.6 10E9/L (ref 4–11)

## 2020-08-15 PROCEDURE — 36415 COLL VENOUS BLD VENIPUNCTURE: CPT | Performed by: STUDENT IN AN ORGANIZED HEALTH CARE EDUCATION/TRAINING PROGRAM

## 2020-08-15 PROCEDURE — 82565 ASSAY OF CREATININE: CPT | Performed by: STUDENT IN AN ORGANIZED HEALTH CARE EDUCATION/TRAINING PROGRAM

## 2020-08-15 PROCEDURE — 25000132 ZZH RX MED GY IP 250 OP 250 PS 637: Performed by: STUDENT IN AN ORGANIZED HEALTH CARE EDUCATION/TRAINING PROGRAM

## 2020-08-15 PROCEDURE — 85018 HEMOGLOBIN: CPT | Performed by: STUDENT IN AN ORGANIZED HEALTH CARE EDUCATION/TRAINING PROGRAM

## 2020-08-15 PROCEDURE — 85027 COMPLETE CBC AUTOMATED: CPT | Performed by: STUDENT IN AN ORGANIZED HEALTH CARE EDUCATION/TRAINING PROGRAM

## 2020-08-15 PROCEDURE — 72200001 ZZH LABOR CARE VAGINAL DELIVERY SINGLE

## 2020-08-15 PROCEDURE — 12000001 ZZH R&B MED SURG/OB UMMC

## 2020-08-15 PROCEDURE — 84450 TRANSFERASE (AST) (SGOT): CPT | Performed by: STUDENT IN AN ORGANIZED HEALTH CARE EDUCATION/TRAINING PROGRAM

## 2020-08-15 PROCEDURE — 84460 ALANINE AMINO (ALT) (SGPT): CPT | Performed by: STUDENT IN AN ORGANIZED HEALTH CARE EDUCATION/TRAINING PROGRAM

## 2020-08-15 RX ADMIN — IBUPROFEN 800 MG: 800 TABLET, FILM COATED ORAL at 06:33

## 2020-08-15 RX ADMIN — ACETAMINOPHEN 650 MG: 325 TABLET, FILM COATED ORAL at 00:23

## 2020-08-15 RX ADMIN — ACETAMINOPHEN 650 MG: 325 TABLET, FILM COATED ORAL at 13:34

## 2020-08-15 RX ADMIN — IBUPROFEN 800 MG: 800 TABLET, FILM COATED ORAL at 20:49

## 2020-08-15 RX ADMIN — IBUPROFEN 800 MG: 800 TABLET, FILM COATED ORAL at 00:23

## 2020-08-15 RX ADMIN — ACETAMINOPHEN 650 MG: 325 TABLET, FILM COATED ORAL at 09:03

## 2020-08-15 RX ADMIN — DOCUSATE SODIUM 50MG AND SENNOSIDES 8.6MG 1 TABLET: 8.6; 5 TABLET, FILM COATED ORAL at 09:55

## 2020-08-15 RX ADMIN — ACETAMINOPHEN 650 MG: 325 TABLET, FILM COATED ORAL at 18:06

## 2020-08-15 RX ADMIN — ACETAMINOPHEN 650 MG: 325 TABLET, FILM COATED ORAL at 04:34

## 2020-08-15 RX ADMIN — DOCUSATE SODIUM 50MG AND SENNOSIDES 8.6MG 2 TABLET: 8.6; 5 TABLET, FILM COATED ORAL at 20:49

## 2020-08-15 RX ADMIN — IBUPROFEN 800 MG: 800 TABLET, FILM COATED ORAL at 12:36

## 2020-08-15 NOTE — DISCHARGE SUMMARY
[facility] Discharge Summary    Nehal Wadsworth MRN# [id number]   Age: 30 year old YOB: 1990     Date of Admission:  2020  Date of Discharge::  2020   Admitting Physician:  Carol Durant MD  Discharge Physician:  Carol Durant MD          Admission Diagnoses:   -  at 41w0d  - Postdates          Discharge Diagnosis:   -  at 41w0d, now delivered  - Shoulder dystocia          Procedures:   Procedure(s): -   - Epidural          Medications Prior to Admission:     Medications Prior to Admission   Medication Sig Dispense Refill Last Dose     Ferrous Sulfate (IRON PO) Take 45 mg by mouth Taking every other day   2020 at Unknown time     Prenatal Vit-Fe Fumarate-FA (PRENATAL 19) CHEW    2020 at Unknown time     acetaminophen (TYLENOL) 325 MG tablet Take 2 tablets (650 mg) by mouth every 6 hours as needed for mild pain Start after Delivery. 100 tablet 0 Unknown at Unknown time     ibuprofen (ADVIL/MOTRIN) 600 MG tablet Take 1 tablet (600 mg) by mouth every 6 hours as needed for moderate pain Start after delivery (Patient not taking: Reported on 2020) 60 tablet 0 Unknown at Unknown time     senna-docusate (SENOKOT-S/PERICOLACE) 8.6-50 MG tablet Take 1 tablet by mouth daily Start after delivery. 100 tablet 0 Unknown at Unknown time             Discharge Medications:        Review of your medicines      CONTINUE these medicines which have NOT CHANGED      Dose / Directions   acetaminophen 325 MG tablet  Commonly known as:  TYLENOL  Used for:  Supervision of other normal pregnancy, antepartum      Dose:  650 mg  Take 2 tablets (650 mg) by mouth every 6 hours as needed for mild pain Start after Delivery.  Quantity:  100 tablet  Refills:  0     ibuprofen 600 MG tablet  Commonly known as:  ADVIL/MOTRIN  Used for:  Supervision of other normal pregnancy, antepartum      Dose:  600 mg  Take 1 tablet (600 mg) by mouth every 6 hours as needed for moderate pain  Start after delivery  Quantity:  60 tablet  Refills:  0     IRON PO      Dose:  45 mg  Take 45 mg by mouth Taking every other day  Refills:  0     Prenatal 19 Chew      Refills:  0     senna-docusate 8.6-50 MG tablet  Commonly known as:  SENOKOT-S/PERICOLACE  Used for:  Supervision of other normal pregnancy, antepartum      Dose:  1 tablet  Take 1 tablet by mouth daily Start after delivery.  Quantity:  100 tablet  Refills:  0                   Consultations:   Anesthesia          Brief Admission History   Nehal Wadsworth is a 30 year old now  who was admitted at 41w0d for postdates induction. Please see H&P for more details         Brief Intrapartum Course:   Ms. Nehal Wadsworth is a 30 year old now  at 41w0d, admitted for postdates induction. She received 1 dose of vaginal misoprostol and proceeded to labor spontaneously after that. She underwent SROM at 1443. She received an epidural for pain. At 2215 the patient was complete and zero station and she began to push. The patient had periods of category II tracing due to minimal variability and intermittent late decelerations however was overall reassuring. The patient pushed with excellent effort and the infant's head delivered at 2246. There was a very slow crown and with gentle traction the anterior shoulder did not deliver. A shoulder dystocia was diagnosed and the patient was put in McRobert's. The bed was already broken down. Again with gentle traction and suprapubic pressures the anterior shoulder was unable to be delivered. At this time the posterior arm (infant's left arm) was grasped and swept out to be delivered. After the posterior arm was delivered the anterior shoulder was then delivered. The total shoulder dystocia was approximately 50 seconds. The infant was stunned and therefore after approximately 20 seconds the cord was clamped and cut and the infant was handed off to the NICU team. APGARs 8/9 at 1 and 5 minutes. Weight 4350g. Placenta  delivered with gentle cord traction; noted to be intact with 3 vessel cord. There was a deep second degree laceration that was repaired in the normal fashion after injection of 1% lidocaine. She received IV pitocin for uterotonic. Fundus firm and lochia minimal at the end of the case.  cc.     There was no concern for clavicle or arm injury and infant was moving all four extremities without issue.           Hospital Course:   The patient's hospital course was unremarkable.  On discharge, her pain was well controlled. Vaginal bleeding is similar to peak menstrual flow.  Voiding without difficulty.  Ambulating well and tolerating a normal diet.  No fever.  Breastfeeding well.  Infant is stable.   She was discharged on post-partum day #2.    She had elevated blood pressures immediately postpartum however never met criteria for gestational hypertension. Her HELLP labs were normal.    Post-partum hemoglobin:   Hemoglobin   Date Value Ref Range Status   08/15/2020 10.7 (L) 11.7 - 15.7 g/dL Final   08/14/2020 11.4 (L) 11.7 - 15.7 g/dL Final           Discharge Instructions and Follow-Up:   Discharge diet: Regular   Discharge activity: Pelvic rest for 6 weeks including no sexual intercourse, tampons, or douching.    Discharge follow-up: Follow up with your primary OB for a routine postpartum visit in 6 weeks           Discharge Disposition:   Discharged to home in stable condition        Georgette Carey MD  Obstetrics and Gynecology, PGY-4  August 16, 2020 , 10:25 AM

## 2020-08-15 NOTE — PROVIDER NOTIFICATION
08/14/20 1930   Provider Notification   Provider Name/Title Dr. Durant and resident   Method of Notification In Department   Request Evaluate in Person   Notification Reason Decels   Called from room to have provider. Dr. Durant in surgery so Dr. Anthony Montero came into room at 1930

## 2020-08-15 NOTE — PROVIDER NOTIFICATION
08/14/20 1945   Labor Care Interventions   Labor Interventions fetal heart rate monitored;Fetal monitoring/strip review every 15 mins;fetal scalp stimulation provided;fetal spiral electrode applied;intravenous fluid bolus administered;provider notified;other (see comments)   Changed positions several times to get FHR back to normal baseline. SVE performed by Dr. Anthony Montero. See notes.

## 2020-08-15 NOTE — PROGRESS NOTES
Called in to evaluate patient for bradycardia as In-house OB- Dr. Durant in OR.     FHR tracing interrupted, but picking up in 90s. Previous baseline 120s. Position changes done, IVF bolus started. Cervix examined and 4-5cm dilated. Due to difficulty with tracing, FSE placed after discussed with patient. FHR 90-100s, moderate variability. Position changed again, FHR 120s. Bladder emptied. Discussed FHR changes with patient and her fiance- if worsened would recommend primary CS due to remoteness from delivery- will monitor closely. All questions answered.     Екатерина Montero MD

## 2020-08-15 NOTE — PLAN OF CARE
Patient admitted today for IOL 2/2 postdates. Admission assessment and paperwork complete. VSS, afebrile. EFM continuous. Normal baseline with moderate variability. Frequent contractions after one dose of vaginal misoprostol. Intraterine resuscitation with  fluid boluses and position changes. Labor pain managed with distraction, position changes, Nitrous Oxide and ultimately epidural. Support person (Broderick gaston) present and very helpful in cares. SROM clear at 1443. Providers kept informed of patient status. Anticipate .

## 2020-08-15 NOTE — PLAN OF CARE
Patient arrived to [facility] unit via wheelchair at 1540 ,with belongings, accompanied by spouse/ significant other, with infant in arms. Received report from  JOVANI   and checked bands with Regina. Unit and room orientation completd. Call light given; no concerns present at this time. Continue with plan of care.

## 2020-08-15 NOTE — PROVIDER NOTIFICATION
08/14/20 6410   Procedure Verification   Patient Identification Verified Using Two Indicators patients with ID band, name AND date of birth   Informed Consent Verification procedure type matches consent;consent matches order   Procedure Verification devices present;equipment present   Pre-Procedure Site(s) Marking    Site(s) Marked by Person Performing the Procedure with Initials N/A   Site(s) was exempt and not marked due to insertion site not predetermined   Informed consent obtained by anesthesia provider. Witnessed by this RN.

## 2020-08-15 NOTE — PLAN OF CARE
of viable Male with Dr. Durant and Dr. Carey in attandance.  NICU present due to category two tracing along with meconium. Nursery RN Elvira MARIE present.  Infants head delivered at 2246, shoulder dystocia was diagnosed, patient was then put in Daxa position and suprapubic pressure was applied. Total shoulder dystocia was approximately 50 seconds, infant then delivered. The infant was stunned and cord cut at 20 seconds and brought to warmer to be assessed by the NICU team. Apgars 8/9. Infant was then brought over to mother at about five minutes of life, skin-to-skin. Placenta delivered without complication, Pitocin bolused, 2nd degree laceration repaired, rayna cares provided.  Mother and baby in stable condition.

## 2020-08-15 NOTE — L&D DELIVERY NOTE
OB Vaginal Delivery Note    Delivery Summary    Ms. Jessica Wadsworth is a 30 year old now  at 41w0d, admitted for postdates induction. She received 1 dose of vaginal misoprostol and proceeded to labor spontaneously after that. She underwent SROM at 1443. She received an epidural for pain. At 2215 the patient was complete and zero station and she began to push. The patient had periods of category II tracing due to minimal variability and intermittent late decelerations however was overall reassuring. The patient pushed with excellent effort and the infant's head delivered at 2246. There was a very slow crown and with gentle traction the anterior shoulder did not deliver. A shoulder dystocia was diagnosed and the patient was put in [facility]. The bed was already broken down. Again with gentle traction and suprapubic pressures the anterior shoulder was unable to be delivered. At this time the posterior arm (infant's left arm) was grasped and swept out to be delivered. After the posterior arm was delivered the anterior shoulder was then delivered. The total shoulder dystocia was approximately 50 seconds. The infant was stunned and therefore after approximately 20 seconds the cord was clamped and cut and the infant was handed off to the NICU team. APGARs 8/9 at 1 and 5 minutes. Weight 4350g. Placenta delivered with gentle cord traction; noted to be intact with 3 vessel cord. There was a deep second degree laceration that was repaired in the normal fashion after injection of 1% lidocaine. She received IV pitocin for uterotonic. Fundus firm and lochia minimal at the end of the case.  cc.    There was no concern for clavicle or arm injury and infant was moving all four extremities without issue.    Results for LIZA, MALE-JESSICA (MRN [id number]) as of 2020 23:57   Ref. Range 2020 22:46   Base Deficit Art Latest Ref Range: 0.0 - 9.6 mmol/L 6.6   Base Deficit Venous Latest Ref Range: 0.0 - 8.1 mmol/L 4.6    Ph Cord Arterial Latest Ref Range: 7.16 - 7.39 pH 7.20   PCO2 Cord Arterial Latest Ref Range: 35 - 71 mm Hg 56   PO2 Cord Arterial Latest Ref Range: 3 - 33 mm Hg 16   Bicarbonate Cord Arterial Latest Ref Range: 16 - 24 mmol/L 22   Ph Cord Blood Venous Latest Ref Range: 7.21 - 7.45 pH 7.31   PCO2 Cord Venous Latest Ref Range: 27 - 57 mm Hg 43   PO2 Cord Venous Latest Ref Range: 21 - 37 mm Hg 26   Bicarbonate Cord Venous Latest Ref Range: 16 - 24 mmol/L 22       Dr Durant was present for entire delivery and repair    Kusum Andres MD  Obstetrics and Gyncology, PGY-4  2020 , 11:47 PM      I was present for delivery of viable male infant, placenta and repair of laceration as above. SD maneuvers as above and <60 second dystocia. NICU was already present due to fetal tracing.  Carol Durant MD          Nehal Wadsworth MRN# [id number]   Age: 30 year old YOB: 1990       GA: 41w0d  GP:   Labor Complications: None   EBL:   mL  Delivery QBL: 111 mL  Delivery Type: Vaginal, Spontaneous   ROM to Delivery Time: (Delivered) Hours: 8 Minutes: 3   Weight: 4.35 kg (9 lb 9.4 oz)    1 Minute 5 Minute 10 Minute   Apgar Totals: 8   9        CAROL DURANT;KUSUM ANDRES;GALE CUMMINGS;ELIZABETH CARRERA     Delivery Details:  Nehal Wadsworth, a 30 year old  female delivered a viable infant with apgars of 8  and 9 . Patient was fully dilated and pushing after 7  hours 33  minutes in active labor. Delivery was via vaginal, spontaneous  to a sterile field under epidural  anesthesia. Infant delivered in vertex  left  occiput  anterior  position. Anterior and posterior shoulders delivered without difficulty. The cord was clamped, cut twice and 3 vessels  were noted. Cord blood was obtained in routine fashion with the following disposition: lab .      Cord complications: none   Placenta delivered at 2020 10:50 PM . Placental disposition was Hospital disposal .  Fundal massage performed and fundus found to be firm.     Episiotomy: none    Perineum, vagina, cervix were inspected, and the following lacerations were noted:   Perineal lacerations: 2nd                Any lacerations were repaired in the usual fashion using 3-0 Vicryl.    Excellent hemostasis was noted. Needle count correct. Infant and patient in delivery room in good and stable condition.        Labor Event Times    Labor onset date:  20 Onset time:   2:40 PM   Dilation complete date:  20 Complete time:  10:13 PM   Start pushing date/time:  2020 2215      Labor Length    1st Stage (hrs):  7 (min):  33   2nd Stage (hrs):  0 (min):  33   3rd Stage (hrs):  0 (min):  4      Labor Events     labor?:  No   steroids:  None  Labor Type:  Induction/Cervical ripening  Predominate monitoring during 1st stage:  continuous electronic fetal monitoring     Antibiotics received during labor?:  No     Rupture date/time: 20 1443   Rupture type:  Spontaneous rupture of membranes occuring during spontaneous labor or augmentation  Fluid color:  Clear  Fluid odor:  Normal     Induction:  Misoprostol  Induction date/time: 20 1119   Cervical ripening date/time:     Indications for induction:  Post-term Gestation     1:1 continuous labor support provided by?:  RN       Delivery/Placenta Date and Time    Delivery Date:  20 Delivery Time:  10:46 PM   Placenta Date/Time:  2020 10:50 PM  Oxytocin given at the time of delivery:  after delivery of baby     Vaginal Counts     Initial count performed by 2 team members:   Two Team Members   Dr. Bhargav Elmore       Needles Suture Godwin Sponges Instruments   Initial counts 2 0 5 0   Added to count 0 1 0    Final counts 2 1 5 0   Placed during labor Accounted for at the end of labor   Yes Yes   NA NA   NA NA    Final count performed by 2 team members:   Two Team Members   Dr. Bhargav Elmore      Final count correct?:   "Yes     Apgars    Living status:  Living   1 Minute 5 Minute 10 Minute 15 Minute 20 Minute   Skin color: 0  1       Heart rate: 2  2       Reflex irritability: 2  2       Muscle tone: 2  2       Respiratory effort: 2  2       Total: 8  9       Apgars assigned by:  COLE TEIXEIRA     Cord    Vessels:  3 Vessels Complications:  None   Cord Blood Disposition:  Lab Gases Sent?:  Yes       Resuscitation    [place] Care at Delivery:  NNP Delivery Attendance Note:  NICU team was asked by Dr Durant to attend the delivery of this post term, male infant with a gestational age of 41 0/7 weeks secondary to decels. At delivery, also found to have meconium stained fluid. He delivered on 2020 at 10:46 PM by . He had some spontaneous respirations and some tone at birth. Clamping of the umbilical cord occurred shortly after birth. He was brought to a preheated warmer, and was dried and stimulated. Loud cry noted upon arrival to warmer. Respirations and tone quickly improved. He continued to have good tone and respiratory effort, color quickly became pink. Apgar scores were 8 and 9 at 1 and 5 minutes of life. Brief exam is WNL except for posterior scalp swelling, non mobile, crossing suture lines - consistent with caput seccundum. Intermittent active movement noted in both arms.  This resuscitation and all procedures were performed and/or supervised by this author.  SOREN Teixeira, COLE-BC     2020, 10:55 PM     [place] Measurements    Weight:  9 lb 9.4 oz Length:  1' 10\"   Head circumference:  13.5 cm       Labor Events and Shoulder Dystocia    Fetal Tracing Prior to Delivery:  Category 2  Fetal Tracing Comments:  lower baseline  Shoulder dystocia present?:  Pos  Anterior shoulder:  right Time body delivered:     Time head delivered:   Help called by:  Dr. Carey   Time recognized:  2020    Time help called:  2020 Physician/Provider:  Dr. Durant   Physician/Provider " arrived:  8/14/2020 2230    NICU arrived:  8/14/2020 2240    Gentle attempt at traction, assisted by maternal expulsive forces?:  Yes       First Maneuver:  Daxa maneuver    Time performed:  8/14/2020 2245    Performed by:  Dr. Carey and RN       Second Maneuver:    Second maneuver:  Suprapubic pressure  Time performed:  8/14/2020 2245    Performed by:  SANJAY Elmore       Third Maneuver  Third maneuver:  Delivery of the posterior arm   Time performed:  8/14/2020 2246    Performed by:  Dr. Carey       Delivery (Maternal) (Provider to Complete) ([id number])    Episiotomy:  None  Perineal lacerations:  2nd Repaired?:  Yes      Blood Loss  Mother: Nehal Wadsworth #[id number]   Start of Mother's Information    IO Blood Loss  08/14/20 1440 - 08/14/20 2347    Delivery QBL (mL) Hospital Encounter 111 mL    Total  111 mL         End of Mother's Information  Mother: Nehal Wadsworth #[id number]         Delivery - Provider to Complete ([id number])    Delivering clinician:  Carol Durant MD  Delivery Type (Choose the 1 that will go to the Birth History):  Vaginal, Spontaneous   Other personnel:   Provider Role   Carol Durant MD MD Schefter, Alexandra, MD Resident   Jenny Elmore RN Delivery Nurse   Elvira Sequeira RN Registered Nurse         Placenta    Delayed Cord Clamping:  Done  Date/Time:  8/14/2020 10:50 PM  Removal:  Spontaneous  Disposition:  Hospital disposal     Anesthesia    Method:  Epidural  Cervical dilation at placement:  0-3          Presentation and Position    Presentation:  Vertex  Position:  Left Occiput Anterior           Georgette Carey MD

## 2020-08-15 NOTE — PROGRESS NOTES
Subjective:   Contractions: more uncomfortable with pressure  Leakage of fluids: clear        Objective:  Patient Vitals for the past 8 hrs:   BP Temp Temp src Resp SpO2   20 123/60 -- -- 18 100 %   20 121/58 -- -- 18 97 %   20 (!) 145/86 98.4  F (36.9  C) Oral 18 98 %   20 132/68 98.3  F (36.8  C) Oral 18 97 %   20 (!) 165/63 -- -- -- 96 %   20 1915 107/53 -- -- -- 96 %   20 1900 -- -- -- -- 96 %   20 1845 100/52 -- -- -- 96 %   20 1830 118/85 -- -- -- 96 %   20 1825 -- -- -- -- 97 %   20 116/58 -- -- -- --   200 123/63 -- -- -- 98 %   20 122/61 -- -- -- --   205 114/61 -- -- -- 97 %   204 120/59 -- -- -- --   20 1812 124/58 -- -- -- --   20 1810 124/57 -- -- -- 98 %   20 1808 126/59 -- -- -- --   20 1806 126/61 -- -- -- --   20 1802 123/60 -- -- -- --   20 1800 134/60 97.9  F (36.6  C) Oral -- 98 %   20 175 125/63 -- -- -- --   20 1756 127/62 -- -- -- --   20 1755 -- -- -- -- 99 %   20 175 128/60 -- -- -- --   20 1752 129/60 -- -- 20 --     Cervix: 9/100/0    EFM:   135 baseline, moderate variablility, no accels, intermittent late decelerations, Category I  Tocometer: external monitor and frequency q 1-3 minutes    Assessment:/Plan:   We will see if we can do epidural bolus and labor down.  Anticipate     SHANTI NETTLES MD

## 2020-08-15 NOTE — PROGRESS NOTES
Post Partum Progress Note  PPD#1    Subjective:  Pt doing well this morning, though hasn't slept much overnight. She is hoping to get more sleep this morning. She has been ambulating and voided spontaneously x1, was a little dizzy with first ambulation, hasn't tried it again yet. She is tolerating PO without issue. Breast feeding    She denies headache, changes in vision, nausea/vomiting, chest pain, shortness of breath, RUQ pain, or worsening edema. Discussed elevated blood pressures after delivery and checking preeclampsia last this morning    Objective:  Vitals:    08/15/20 0025 08/15/20 0040 08/15/20 0110 08/15/20 0500   BP: 123/65 122/69  131/64   Resp: 18 18 18 16   Temp:    98.4  F (36.9  C)   TempSrc:    Oral   SpO2: 99% 99% 98% 98%   Weight:       Height:           General: NAD, resting comfortably  CV: Regular rate and rhythm  Pulm: Normal respiratory effort, clear to auscultation  Abd: Soft, non-tender, non-distended. Fundus is firm but 2cm above umbilicus and deviated to the left.    Ext: trace lower extremity edema bilaterally. No calf tenderness.    Assessment/Plan:  Nehal Wadsworth is a 30 year old  female who is PPD#1 s/p  complicated by 50 second shoulder dystocia relieved with delivery of the posterior arm. Patient and infant stable in room this morning.    - Encourage routine post-operative goals including ambulation and incentive spirometry  - PNC: Rh pos. Rubella immune. No intervention indicated.  - Pain: controlled on oral medications  - Heme: Hgb 11.4>EBL 111mL > 10.7  - GI: continue anti-emetics and stool softeners as needed.  - :Voiding spontaneously since delivery.  - Infant: Stable in room, moving all extremities  - Feeding: Plans on breastfeeding.  - BC: did not discuss    Elevated blood pressures, does not meet criteria for gestational hypertension  - HELLP labs wnl this morning, no sign of preeclampsia at this time. Discussed warning signs and plan for discharge with a blood  pressure cuff.    Discharge to home on likely PPD#2    Georgette Carey MD  Obstetrics and Gyncology, PGY-3  August 15, 2020 , 8:13 AM     Physician Attestation   I, Shannon Feng MD, personally examined and evaluated this patient.  I discussed the patient with the resident/fellow and care team, and agree with the assessment and plan of care as documented in the note of 20.      I personally reviewed vital signs, medications, labs and exam.    Key findings: 30 year old   on PPD 1 s/p  complicated by shoulder dystocia. Doing well. Continue routine advancements. Anticipate discharge to home tomorrow.   Shannon Feng MD  Date of Service (when I saw the patient): 08/15/20

## 2020-08-15 NOTE — PROVIDER NOTIFICATION
08/14/20 2125   Provider Notification   Provider Name/Title [facility]    Method of Notification At Bedside   Request Evaluate in Person   Notification Reason Pain   Anesthesia at bedside assessing patients pain management with epidural. Provider adminstered bolus. Plan to monitor patient closely and q5 min blood pressures for the next 20 minutes. Will continue to monitor closely.

## 2020-08-15 NOTE — PLAN OF CARE
VSS, postpartum assessments WNL. Patient is voiding without difficulty and passing gas. Up ad flo with limited assistance. Perineum appears to be healing well, lochia WNL, no s/s infection. Patient is breastfeeding infant with minimal assistance and knows to notify staff prior to feeds to check infants blood glucose. Taking ibuprofen and tylenol for pain management along with ice packs, rayna bottle and tucks. Patient is coping well with pain management. Education provided on postpartum cares, breastfeeding, infant safety and pain management. Positive attachment behaviors observed with infant. All questions and concerns have been addressed. Support person present. Plan is to transfer patient to [facility] today. Patient is agreeable with her plan of care.

## 2020-08-15 NOTE — PROVIDER NOTIFICATION
08/14/20 1930   Fetal Assessment   Fetal HR Assessment Method external US   Fetal HR (beats/min) 125  (Tracing poor, difficult to identify FHR)   Fetal Heart Baseline Rate normal range   Fetal HR Variability moderate (amplitude range 6 to 25 bpm)   Fetal HR Accelerations absent   Fetal HR Decelerations prolonged;intermittent;variable   At 1920 FHR stopped tracing continuously. RN went into room and started to readjust. FHT 90's bpm. Called for MD to room.

## 2020-08-15 NOTE — PROVIDER NOTIFICATION
20 2880   Provider Notification   Provider Name/Title Dr. Carey    Method of Notification At Bedside   Request Evaluate in Person   Notification Reason Labor Status;SVE   Provider at bedside assessing patients current labor status. Patient experiencing intense pelvic/rectal pressure with contractions and having the urge to push. Provider performed cervical exam, patient was complete. Plan is for patient to start to push, notify Dr. Durant and anticipate .

## 2020-08-15 NOTE — PLAN OF CARE
Data: Vital signs within normal limits. Postpartum checks within normal limits - see flow record. Patient eating and drinking normally. Patient able to empty bladder independently and is up ambulating. No apparent signs of infection.  Patient performing self cares and is able to care for infant.  Action: Patient medicated during the shift for perineal soreness. See MAR. Alternating ice packs and tucks pads on bottom for soreness.  Patient reassessed within 1 hour after each medication and pain was improved - patient stated she was comfortable. Breastfeeding support provided.  Patient education done about breastfeeding and postpartum recovery. See flow record.  Response: Positive attachment behaviors observed with infant. Support persons, Broderick (JA), present.   Plan: Continue with postpartum cares and breastfeeding support.

## 2020-08-15 NOTE — PROVIDER NOTIFICATION
08/14/20 1800   Fetal Assessment   Fetal Movement active   Fetal HR Assessment Method external US   Fetal HR (beats/min) 125  (125 then increased to 150. Sitting up for epidural)   Fetal Heart Baseline Rate normal range   Fetal HR Variability moderate (amplitude range 6 to 25 bpm)   Fetal HR Accelerations absent   Fetal HR Decelerations intermittent;variable   Artifact in tracing due to patient positioning during epidural placement.

## 2020-08-15 NOTE — PROVIDER NOTIFICATION
08/14/20 2200   Provider Notification   Provider Name/Title Dr. Durant   Method of Notification Electronic Page   Request Evaluate in Person   Notification Reason Labor Status;Pain   Notified provider that patient is uncomfortable and feeling urge to push. Plan if for RN to assess cervix, if complete start pushing.

## 2020-08-15 NOTE — PROVIDER NOTIFICATION
08/14/20 2110   Provider Notification   Provider Name/Title Dr. Durant   Method of Notification At Bedside   Request Evaluate in Person   Notification Reason Labor Status;SVE   Provider at bedside assessing patient's labor status. SVE 9//0. Patient is becoming more uncomfortable with contractions, feeling a lot of rectal/pelvic pressure. Patient has been pushing PCEA several times. Plan is to notify anesthesia to assess patient and determine if she is eligible for a bolus. Patient plans to rest for a while and then will reassess labor status. Will continue to reposition and monitor closely.

## 2020-08-15 NOTE — LACTATION NOTE
This note was copied from a baby's chart.  Met with family this afternoon in labor and delivery, assist with latch and positioning for first baby, LGA at 41 weeks. Taught how to use pillows, blankets for support, position nose to nipple and practiced laid back postioning. Mom able to achieve latch on her own in laid back after demo first and broke seal, had her re-latch. Baby with deep latch, mom reports comfortable and able to point out deeper jaw pulls, intermittent swallow. Reviewed benefits of frequent breast massage and hand expressing in early days until milk comes in, encouraged to do after each feeding to help keep baby more satisfied, decrease weight loss and help her milk come in sooner with good supply.

## 2020-08-16 ENCOUNTER — RECORDS - HEALTHEAST (OUTPATIENT)
Dept: ADMINISTRATIVE | Facility: OTHER | Age: 30
End: 2020-08-16

## 2020-08-16 VITALS
RESPIRATION RATE: 18 BRPM | WEIGHT: 213 LBS | HEIGHT: 63 IN | DIASTOLIC BLOOD PRESSURE: 77 MMHG | HEART RATE: 70 BPM | BODY MASS INDEX: 37.74 KG/M2 | TEMPERATURE: 98.4 F | OXYGEN SATURATION: 96 % | SYSTOLIC BLOOD PRESSURE: 127 MMHG

## 2020-08-16 PROCEDURE — 25000132 ZZH RX MED GY IP 250 OP 250 PS 637: Performed by: STUDENT IN AN ORGANIZED HEALTH CARE EDUCATION/TRAINING PROGRAM

## 2020-08-16 RX ADMIN — ACETAMINOPHEN 650 MG: 325 TABLET, FILM COATED ORAL at 08:55

## 2020-08-16 RX ADMIN — ACETAMINOPHEN 650 MG: 325 TABLET, FILM COATED ORAL at 00:43

## 2020-08-16 RX ADMIN — DOCUSATE SODIUM 50MG AND SENNOSIDES 8.6MG 2 TABLET: 8.6; 5 TABLET, FILM COATED ORAL at 08:55

## 2020-08-16 RX ADMIN — IBUPROFEN 800 MG: 800 TABLET, FILM COATED ORAL at 08:55

## 2020-08-16 NOTE — PROGRESS NOTES
[facility] [place]    Post-Partum Progress Note    Assessment & Plan   Assessment:  Post-partum day #2  Normal spontaneous vaginal delivery  50 sec shoulder dystocia    Doing well.  No excessive bleeding  Pain well-controlled.    Plan:  Discharge later today  Discussed sitz baths and shoulder dystocia reviewed--probably due to delivery at end of contraction vs posterior arm across baby back questions answered.    SHANTI NETTLES     Interval History   Doing well.  Pain is well-controlled.  No fevers.  No history of foul-smelling vaginal discharge.  Good appetite.  Denies chest pain, shortness of breath, nausea or vomiting.  Vaginal bleeding is similar to a heavy menstrual flow.  Ambulatory.  Breastfeeding well.    Medications     nitrous oxide/oxygen 50/50 blend       - MEDICATION INSTRUCTIONS -       NO Rho (D) immune globulin (RhoGam) needed - mother Rh POSITIVE       - MEDICATION INSTRUCTIONS -       oxytocin in 0.9% NaCl Stopped (08/15/20 0325)     oxytocin in 0.9% NaCl         senna-docusate  1 tablet Oral BID    Or     senna-docusate  2 tablet Oral BID       Physical Exam   Temp: 98.4  F (36.9  C) Temp src: Oral BP: 127/77 Pulse: 70   Resp: 18 SpO2: 96 %      Vitals:    08/14/20 1022   Weight: 96.6 kg (213 lb)     Vital Signs with Ranges  Temp:  [97.6  F (36.4  C)-98.8  F (37.1  C)] 98.4  F (36.9  C)  Pulse:  [70-82] 70  Resp:  [16-18] 18  BP: (120-127)/(65-80) 127/77  SpO2:  [96 %] 96 %  I/O last 3 completed shifts:  In: 500 [P.O.:500]  Out: -     Uterine fundus is firm, non-tender and at the level of the umbilicus    Data   Recent Labs   Lab Test 08/14/20  1004   ABO A   RH Pos   AS Neg     Recent Labs   Lab Test 08/15/20  0718 08/14/20  1004   HGB 10.7* 11.4*     Recent Labs   Lab Test 01/13/20  0849   RUQIGG 17

## 2020-08-16 NOTE — DISCHARGE INSTRUCTIONS
Preeclampsia   Call your doctor right away if you have any of the following:  - Edema (swelling) in your face or hands  - Rapid weight gain-about 1 pound or more in a day  - Headache  - Abdominal pain on your right side  - Vision problems (flashes or spots)  - You have questions or concerns once you return home.      Postpartum Vaginal Delivery Instructions    Activity       Ask family and friends for help when you need it.    Do not place anything in your vagina for 6 weeks.    You are not restricted on other activities, but take it easy for a few weeks to allow your body to recover from delivery.  You are able to do any activities you feel up to that point.    No driving until you have stopped taking your pain medications (usually two weeks after delivery).     Call your health care provider if you have any of these symptoms:       Increased pain, swelling, redness, or fluid around your stiches from an episiotomy or perineal tear.    A fever above 100.4 F (38 C) with or without chills when placing a thermometer under your tongue.    You soak a sanitary pad with blood within 1 hour, or you see blood clots larger than a golf ball.    Bleeding that lasts more than 6 weeks.    Vaginal discharge that smells bad.    Severe pain, cramping or tenderness in your lower belly area.    A need to urinate more frequently (use the toilet more often), more urgently (use the toilet very quickly), or it burns when you urinate.    Nausea and vomiting.    Redness, swelling or pain around a vein in your leg.    Problems breastfeeding or a red or painful area on your breast.    Chest pain and cough or are gasping for air.    Problems coping with sadness, anxiety, or depression.  If you have any concerns about hurting yourself or the baby, call your provider immediately.     You have questions or concerns after you return home.     Keep your hands clean:  Always wash your hands before touching your perineal area and stitches.  This  helps reduce your risk of infection.  If your hands aren't dirty, you may use an alcohol hand-rub to clean your hands. Keep your nails clean and short.

## 2020-08-16 NOTE — PLAN OF CARE
Patient taking care of self and infant independently. Breastfeeding independently, good latch observed. Postpartum checks wnl. Sent with electric breast pump, educated on use and care of pump.  Pt educated on discharge instructions and given written handout, verbalized understanding of instructions.  Patient finishing packing, will call out when ready for transportation.

## 2020-08-16 NOTE — PLAN OF CARE
Vss. Assessments wnl. Pain well managed. Ambulating. Voiding without difficulties. Breastfeeding well and bonding with infant. No concerns at this time. Will continue with routine pp cares.

## 2020-08-16 NOTE — PLAN OF CARE
Data: Vital signs within normal limits. Postpartum checks within normal limits - see flow record. Patient eating and drinking normally. Patient able to empty bladder independently and is up ambulating. No apparent signs of infection. Patient performing self cares and is able to care for infant. Pt received tylenol for pain, will call if additional management is needed. Patient education done about breastfeeding.  Response: Positive attachment behaviors observed with infant. Support person, significant other, Broderick, present.

## 2020-09-29 ENCOUNTER — PRENATAL OFFICE VISIT (OUTPATIENT)
Dept: OBGYN | Facility: CLINIC | Age: 30
End: 2020-09-29
Payer: COMMERCIAL

## 2020-09-29 ENCOUNTER — TELEPHONE (OUTPATIENT)
Dept: OBGYN | Facility: CLINIC | Age: 30
End: 2020-09-29

## 2020-09-29 VITALS
OXYGEN SATURATION: 98 % | DIASTOLIC BLOOD PRESSURE: 76 MMHG | WEIGHT: 193.5 LBS | SYSTOLIC BLOOD PRESSURE: 118 MMHG | TEMPERATURE: 97.4 F | HEART RATE: 75 BPM | BODY MASS INDEX: 34.28 KG/M2

## 2020-09-29 DIAGNOSIS — Z11.59 ENCOUNTER FOR SCREENING FOR OTHER VIRAL DISEASES: Primary | ICD-10-CM

## 2020-09-29 DIAGNOSIS — Z12.4 PAP SMEAR FOR CERVICAL CANCER SCREENING: ICD-10-CM

## 2020-09-29 DIAGNOSIS — N99.2 POSTPROCEDURAL ADHESIONS OF VAGINA: ICD-10-CM

## 2020-09-29 DIAGNOSIS — Z23 NEED FOR PROPHYLACTIC VACCINATION AND INOCULATION AGAINST INFLUENZA: ICD-10-CM

## 2020-09-29 PROCEDURE — 99207 ZZC POST PARTUM EXAM: CPT | Performed by: OBSTETRICS & GYNECOLOGY

## 2020-09-29 PROCEDURE — 90686 IIV4 VACC NO PRSV 0.5 ML IM: CPT | Performed by: OBSTETRICS & GYNECOLOGY

## 2020-09-29 PROCEDURE — 90471 IMMUNIZATION ADMIN: CPT | Performed by: OBSTETRICS & GYNECOLOGY

## 2020-09-29 RX ORDER — CEFAZOLIN SODIUM 2 G/100ML
2 INJECTION, SOLUTION INTRAVENOUS
Status: CANCELLED | OUTPATIENT
Start: 2020-10-13

## 2020-09-29 RX ORDER — CEFAZOLIN SODIUM 1 G/3ML
1 INJECTION, POWDER, FOR SOLUTION INTRAMUSCULAR; INTRAVENOUS SEE ADMIN INSTRUCTIONS
Status: CANCELLED | OUTPATIENT
Start: 2020-10-13

## 2020-09-29 ASSESSMENT — PATIENT HEALTH QUESTIONNAIRE - PHQ9: SUM OF ALL RESPONSES TO PHQ QUESTIONS 1-9: 1

## 2020-09-29 NOTE — PROGRESS NOTES
SUBJECTIVE:  Nehal Wadsworth is a 30 year old female  here for a postpartum visit.  She had a  on 20 delivering a healthy baby boy weighing 9 lbs 9 oz at term.      Today's Depression Rating was 1    delivery complications:  Yes, short shoulder dystocia and discussed today, possible IOL at 39 wks with next baby   breast feeding:  Yes, and supplementing formula  bladder problems:  No  bowel problems/hemorrhoids:  No  episiotomy/laceration/incision healed? No, has a very sore area down there and hurts to use toilet paper  vaginal flow:  None  [place]:  No  contraception:  none  emotional adjustment:  doing well and happy  back to work:  6 weeks    No past medical history on file.    Past Surgical History:   Procedure Laterality Date     EYE SURGERY      lazy eye correction     HC TOOTH EXTRACTION W/FORCEP          Allergies   Allergen Reactions     No Clinical Screening - See Comments      MMR-got lazy eye.     Current Outpatient Medications   Medication Sig Dispense Refill     acetaminophen (TYLENOL) 325 MG tablet Take 2 tablets (650 mg) by mouth every 6 hours as needed for mild pain Start after Delivery. 100 tablet 0     Prenatal Vit-Fe Fumarate-FA (PRENATAL 19) CHEW        senna-docusate (SENOKOT-S/PERICOLACE) 8.6-50 MG tablet Take 1 tablet by mouth daily Start after delivery. 100 tablet 0         OBJECTIVE:  Blood pressure 118/76, pulse 75, temperature 97.4  F (36.3  C), temperature source Oral, weight 87.8 kg (193 lb 8 oz), last menstrual period 2019, SpO2 98 %, currently breastfeeding.   General - pleasant female in no acute distress.  Breast - deferred  Abdomen - soft, nontender, nondistended, no hepatosplenomegaly.  Pelvic - EG: adult female with skin bridge noted at introitus about 4 mm thick, (see images) BUS: within normal limits, Vagina: atrophic, no discharge, Cervix: no lesions or CMT, Uterus: firm, normal sized and nontender, Adnexae: no masses or tenderness.  Rectovaginal -  deferred.    ASSESSMENT:  postpartum exam  Skin bridge at introitus from poor healing/atrophy    PLAN:  May resume normal activities without restrictions  Pap smear was NOT done today--pt could not tolerate speculum    The patient will use condoms/abstence for birth control. Full counseling was provided, and all questions answered. Compliance is strongly emphasized.    Discussed repair of skin bridge.  This will need to be done under local anesthesia and cut that area with probable cautery to keep it open.  We will plan to do this in the operating room under IV sedation.  Discussed pump and dump depending on medications.  Reviewed risk of bleeding, infection, undesired cosmetic outcome and questions were answered.  We will plan sits baths for 2 weeks and then I will see her back.  She was reassured that she is not crazy and something is wrong.  She hopes to schedule this prior to end of her maternity leave.    SHANTI NETTLES MD

## 2020-09-29 NOTE — TELEPHONE ENCOUNTER
Type of surgery: gyn  Location of surgery: [facility] [place] OR  Date and time of surgery: 10/13/20 1230p  Surgeon: Bhargav  Pre-Op Appt Date: surgeon to do  Post-Op Appt Date: 10/29/20   Packet sent out: Yes  Pre-cert/Authorization completed:  No  Date: 09/29/20  Angie Estrella, Surgery Coordinator

## 2020-10-05 NOTE — TELEPHONE ENCOUNTER
Patient returned call. Is aware surgery is at 1100a on 10/13.  Angie Estrella, Surgery Coordinator

## 2020-10-05 NOTE — TELEPHONE ENCOUNTER
[facility]- called to inform patient OR has moved surgery up to 1100a.   Angie Estrella, Surgery Coordinator

## 2020-10-06 ENCOUNTER — OFFICE VISIT - HEALTHEAST (OUTPATIENT)
Dept: FAMILY MEDICINE | Facility: CLINIC | Age: 30
End: 2020-10-06

## 2020-10-06 ENCOUNTER — COMMUNICATION - HEALTHEAST (OUTPATIENT)
Dept: FAMILY MEDICINE | Facility: CLINIC | Age: 30
End: 2020-10-06

## 2020-10-06 DIAGNOSIS — Z01.818 PRE-OP EXAM: Primary | ICD-10-CM

## 2020-10-06 DIAGNOSIS — D22.5 MELANOCYTIC NEVUS OF TRUNK: ICD-10-CM

## 2020-10-06 DIAGNOSIS — N99.2 POSTPROCEDURAL ADHESIONS OF VAGINA: ICD-10-CM

## 2020-10-09 DIAGNOSIS — Z11.59 ENCOUNTER FOR SCREENING FOR OTHER VIRAL DISEASES: ICD-10-CM

## 2020-10-09 PROCEDURE — U0003 INFECTIOUS AGENT DETECTION BY NUCLEIC ACID (DNA OR RNA); SEVERE ACUTE RESPIRATORY SYNDROME CORONAVIRUS 2 (SARS-COV-2) (CORONAVIRUS DISEASE [COVID-19]), AMPLIFIED PROBE TECHNIQUE, MAKING USE OF HIGH THROUGHPUT TECHNOLOGIES AS DESCRIBED BY CMS-2020-01-R: HCPCS | Performed by: OBSTETRICS & GYNECOLOGY

## 2020-10-10 LAB
SARS-COV-2 RNA SPEC QL NAA+PROBE: NOT DETECTED
SPECIMEN SOURCE: NORMAL

## 2020-10-12 DIAGNOSIS — Z01.818 PRE-OP EXAM: ICD-10-CM

## 2020-10-12 DIAGNOSIS — N99.2 POSTPROCEDURAL ADHESIONS OF VAGINA: ICD-10-CM

## 2020-10-12 LAB
ABO + RH BLD: NORMAL
ABO + RH BLD: NORMAL
BLD GP AB SCN SERPL QL: NORMAL
BLOOD BANK CMNT PATIENT-IMP: NORMAL
ERYTHROCYTE [DISTWIDTH] IN BLOOD BY AUTOMATED COUNT: 13.3 % (ref 10–15)
HCT VFR BLD AUTO: 38.1 % (ref 35–47)
HGB BLD-MCNC: 12.9 G/DL (ref 11.7–15.7)
MCH RBC QN AUTO: 31.9 PG (ref 26.5–33)
MCHC RBC AUTO-ENTMCNC: 33.9 G/DL (ref 31.5–36.5)
MCV RBC AUTO: 94 FL (ref 78–100)
PLATELET # BLD AUTO: 329 10E9/L (ref 150–450)
RBC # BLD AUTO: 4.04 10E12/L (ref 3.8–5.2)
SPECIMEN EXP DATE BLD: NORMAL
WBC # BLD AUTO: 7 10E9/L (ref 4–11)

## 2020-10-12 PROCEDURE — 85027 COMPLETE CBC AUTOMATED: CPT | Performed by: OBSTETRICS & GYNECOLOGY

## 2020-10-12 PROCEDURE — 36415 COLL VENOUS BLD VENIPUNCTURE: CPT | Performed by: OBSTETRICS & GYNECOLOGY

## 2020-10-12 PROCEDURE — 86901 BLOOD TYPING SEROLOGIC RH(D): CPT | Performed by: OBSTETRICS & GYNECOLOGY

## 2020-10-12 PROCEDURE — 86850 RBC ANTIBODY SCREEN: CPT | Performed by: OBSTETRICS & GYNECOLOGY

## 2020-10-12 PROCEDURE — 86900 BLOOD TYPING SEROLOGIC ABO: CPT | Performed by: OBSTETRICS & GYNECOLOGY

## 2020-10-13 ENCOUNTER — ANESTHESIA EVENT (OUTPATIENT)
Dept: SURGERY | Facility: CLINIC | Age: 30
End: 2020-10-13
Payer: COMMERCIAL

## 2020-10-13 ENCOUNTER — HOSPITAL ENCOUNTER (OUTPATIENT)
Facility: CLINIC | Age: 30
Discharge: HOME | End: 2020-10-13
Attending: OBSTETRICS & GYNECOLOGY | Admitting: OBSTETRICS & GYNECOLOGY
Payer: COMMERCIAL

## 2020-10-13 ENCOUNTER — ANESTHESIA (OUTPATIENT)
Dept: SURGERY | Facility: CLINIC | Age: 30
End: 2020-10-13
Payer: COMMERCIAL

## 2020-10-13 VITALS
HEIGHT: 63 IN | RESPIRATION RATE: 16 BRPM | SYSTOLIC BLOOD PRESSURE: 101 MMHG | BODY MASS INDEX: 34.41 KG/M2 | OXYGEN SATURATION: 99 % | WEIGHT: 194.22 LBS | DIASTOLIC BLOOD PRESSURE: 75 MMHG | HEART RATE: 51 BPM | TEMPERATURE: 97.4 F

## 2020-10-13 DIAGNOSIS — N99.2 POSTPROCEDURAL ADHESIONS OF VAGINA: ICD-10-CM

## 2020-10-13 DIAGNOSIS — Z12.4 PAP SMEAR FOR CERVICAL CANCER SCREENING: ICD-10-CM

## 2020-10-13 LAB
GLUCOSE BLDC GLUCOMTR-MCNC: 89 MG/DL (ref 70–99)
HCG UR QL: NEGATIVE

## 2020-10-13 PROCEDURE — 272N000001 HC OR GENERAL SUPPLY STERILE: Performed by: OBSTETRICS & GYNECOLOGY

## 2020-10-13 PROCEDURE — 250N000009 HC RX 250: Performed by: OBSTETRICS & GYNECOLOGY

## 2020-10-13 PROCEDURE — 999N000139 HC STATISTIC PRE-PROCEDURE ASSESSMENT II: Performed by: OBSTETRICS & GYNECOLOGY

## 2020-10-13 PROCEDURE — 370N000002 HC ANESTHESIA TECHNICAL FEE, EACH ADDTL 15 MIN: Performed by: OBSTETRICS & GYNECOLOGY

## 2020-10-13 PROCEDURE — 56441 LYSIS OF LABIAL ADHESIONS: CPT | Performed by: OBSTETRICS & GYNECOLOGY

## 2020-10-13 PROCEDURE — G0145 SCR C/V CYTO,THINLAYER,RESCR: HCPCS | Performed by: OBSTETRICS & GYNECOLOGY

## 2020-10-13 PROCEDURE — 250N000011 HC RX IP 250 OP 636: Performed by: NURSE ANESTHETIST, CERTIFIED REGISTERED

## 2020-10-13 PROCEDURE — 370N000001 HC ANESTHESIA TECHNICAL FEE, 1ST 30 MIN: Performed by: OBSTETRICS & GYNECOLOGY

## 2020-10-13 PROCEDURE — 87624 HPV HI-RISK TYP POOLED RSLT: CPT | Performed by: OBSTETRICS & GYNECOLOGY

## 2020-10-13 PROCEDURE — 761N000007 HC RECOVERY PHASE 2 EACH 15 MINS: Performed by: OBSTETRICS & GYNECOLOGY

## 2020-10-13 PROCEDURE — 81025 URINE PREGNANCY TEST: CPT | Performed by: OBSTETRICS & GYNECOLOGY

## 2020-10-13 PROCEDURE — 360N000016 HC SURGERY LEVEL 2 1ST 30 MIN - UMMC: Performed by: OBSTETRICS & GYNECOLOGY

## 2020-10-13 PROCEDURE — 258N000003 HC RX IP 258 OP 636: Performed by: NURSE ANESTHETIST, CERTIFIED REGISTERED

## 2020-10-13 PROCEDURE — 360N000017 HC SURGERY LEVEL 2 EA 15 ADDTL MIN - UMMC: Performed by: OBSTETRICS & GYNECOLOGY

## 2020-10-13 PROCEDURE — 999N001017 HC STATISTIC GLUCOSE BY METER IP

## 2020-10-13 PROCEDURE — 250N000009 HC RX 250: Performed by: NURSE ANESTHETIST, CERTIFIED REGISTERED

## 2020-10-13 RX ORDER — ACETAMINOPHEN 325 MG/1
650 TABLET ORAL
Status: DISCONTINUED | OUTPATIENT
Start: 2020-10-13 | End: 2020-10-13 | Stop reason: HOSPADM

## 2020-10-13 RX ORDER — NALOXONE HYDROCHLORIDE 0.4 MG/ML
.1-.4 INJECTION, SOLUTION INTRAMUSCULAR; INTRAVENOUS; SUBCUTANEOUS
Status: DISCONTINUED | OUTPATIENT
Start: 2020-10-13 | End: 2020-10-13 | Stop reason: HOSPADM

## 2020-10-13 RX ORDER — LIDOCAINE HYDROCHLORIDE 20 MG/ML
INJECTION, SOLUTION INFILTRATION; PERINEURAL PRN
Status: DISCONTINUED | OUTPATIENT
Start: 2020-10-13 | End: 2020-10-13

## 2020-10-13 RX ORDER — ONDANSETRON 2 MG/ML
4 INJECTION INTRAMUSCULAR; INTRAVENOUS EVERY 30 MIN PRN
Status: DISCONTINUED | OUTPATIENT
Start: 2020-10-13 | End: 2020-10-13 | Stop reason: HOSPADM

## 2020-10-13 RX ORDER — ONDANSETRON 2 MG/ML
INJECTION INTRAMUSCULAR; INTRAVENOUS PRN
Status: DISCONTINUED | OUTPATIENT
Start: 2020-10-13 | End: 2020-10-13

## 2020-10-13 RX ORDER — ONDANSETRON 4 MG/1
4 TABLET, ORALLY DISINTEGRATING ORAL EVERY 30 MIN PRN
Status: DISCONTINUED | OUTPATIENT
Start: 2020-10-13 | End: 2020-10-13 | Stop reason: HOSPADM

## 2020-10-13 RX ORDER — KETOROLAC TROMETHAMINE 30 MG/ML
INJECTION, SOLUTION INTRAMUSCULAR; INTRAVENOUS PRN
Status: DISCONTINUED | OUTPATIENT
Start: 2020-10-13 | End: 2020-10-13

## 2020-10-13 RX ORDER — MEPERIDINE HYDROCHLORIDE 25 MG/ML
12.5 INJECTION INTRAMUSCULAR; INTRAVENOUS; SUBCUTANEOUS
Status: DISCONTINUED | OUTPATIENT
Start: 2020-10-13 | End: 2020-10-13 | Stop reason: HOSPADM

## 2020-10-13 RX ORDER — PROPOFOL 10 MG/ML
INJECTION, EMULSION INTRAVENOUS CONTINUOUS PRN
Status: DISCONTINUED | OUTPATIENT
Start: 2020-10-13 | End: 2020-10-13

## 2020-10-13 RX ORDER — LIDOCAINE HYDROCHLORIDE 10 MG/ML
INJECTION, SOLUTION INFILTRATION; PERINEURAL PRN
Status: DISCONTINUED | OUTPATIENT
Start: 2020-10-13 | End: 2020-10-13 | Stop reason: HOSPADM

## 2020-10-13 RX ORDER — SODIUM CHLORIDE, SODIUM LACTATE, POTASSIUM CHLORIDE, CALCIUM CHLORIDE 600; 310; 30; 20 MG/100ML; MG/100ML; MG/100ML; MG/100ML
INJECTION, SOLUTION INTRAVENOUS CONTINUOUS PRN
Status: DISCONTINUED | OUTPATIENT
Start: 2020-10-13 | End: 2020-10-13

## 2020-10-13 RX ORDER — DEXAMETHASONE SODIUM PHOSPHATE 4 MG/ML
INJECTION, SOLUTION INTRA-ARTICULAR; INTRALESIONAL; INTRAMUSCULAR; INTRAVENOUS; SOFT TISSUE PRN
Status: DISCONTINUED | OUTPATIENT
Start: 2020-10-13 | End: 2020-10-13

## 2020-10-13 RX ORDER — SODIUM CHLORIDE, SODIUM LACTATE, POTASSIUM CHLORIDE, CALCIUM CHLORIDE 600; 310; 30; 20 MG/100ML; MG/100ML; MG/100ML; MG/100ML
INJECTION, SOLUTION INTRAVENOUS CONTINUOUS
Status: DISCONTINUED | OUTPATIENT
Start: 2020-10-13 | End: 2020-10-13 | Stop reason: HOSPADM

## 2020-10-13 RX ORDER — FENTANYL CITRATE 50 UG/ML
INJECTION, SOLUTION INTRAMUSCULAR; INTRAVENOUS PRN
Status: DISCONTINUED | OUTPATIENT
Start: 2020-10-13 | End: 2020-10-13

## 2020-10-13 RX ADMIN — FENTANYL CITRATE 25 MCG: 50 INJECTION, SOLUTION INTRAMUSCULAR; INTRAVENOUS at 12:08

## 2020-10-13 RX ADMIN — KETOROLAC TROMETHAMINE 30 MG: 30 INJECTION, SOLUTION INTRAMUSCULAR at 12:01

## 2020-10-13 RX ADMIN — PROPOFOL 250 MCG/KG/MIN: 10 INJECTION, EMULSION INTRAVENOUS at 11:44

## 2020-10-13 RX ADMIN — ONDANSETRON 4 MG: 2 INJECTION INTRAMUSCULAR; INTRAVENOUS at 11:45

## 2020-10-13 RX ADMIN — SODIUM CHLORIDE, POTASSIUM CHLORIDE, SODIUM LACTATE AND CALCIUM CHLORIDE: 600; 310; 30; 20 INJECTION, SOLUTION INTRAVENOUS at 11:42

## 2020-10-13 RX ADMIN — FENTANYL CITRATE 25 MCG: 50 INJECTION, SOLUTION INTRAMUSCULAR; INTRAVENOUS at 11:50

## 2020-10-13 RX ADMIN — DEXAMETHASONE SODIUM PHOSPHATE 4 MG: 4 INJECTION, SOLUTION INTRAMUSCULAR; INTRAVENOUS at 11:45

## 2020-10-13 RX ADMIN — FENTANYL CITRATE 50 MCG: 50 INJECTION, SOLUTION INTRAMUSCULAR; INTRAVENOUS at 11:46

## 2020-10-13 RX ADMIN — MIDAZOLAM 2 MG: 1 INJECTION INTRAMUSCULAR; INTRAVENOUS at 11:41

## 2020-10-13 RX ADMIN — LIDOCAINE HYDROCHLORIDE 100 MG: 20 INJECTION, SOLUTION INFILTRATION; PERINEURAL at 11:46

## 2020-10-13 ASSESSMENT — MIFFLIN-ST. JEOR: SCORE: 1565.13

## 2020-10-13 ASSESSMENT — ENCOUNTER SYMPTOMS: SEIZURES: 1

## 2020-10-13 NOTE — ANESTHESIA CARE TRANSFER NOTE
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    Anesthesia Type:   General     Note:  Airway :Room Air  Patient transferred to:Phase II  Handoff Report: Identifed the Patient, Identified the Reponsible Provider, Reviewed the pertinent medical history, Discussed the surgical course, Reviewed Intra-OP anesthesia mangement and issues during anesthesia, Set expectations for post-procedure period and Allowed opportunity for questions and acknowledgement of understanding      Vitals: (Last set prior to Anesthesia Care Transfer)    CRNA VITALS  10/13/2020 1144 - 10/13/2020 1214      10/13/2020             Resp Rate (observed):  (!) 1                Electronically Signed By: SOREN Johnson CRNA  October 13, 2020  12:14 PM

## 2020-10-13 NOTE — OP NOTE
Gynecology Operative Note    2020     Pre-operative diagnosis:  Adhesions of vagina  Cervical cancer screening    Post-operative diagnosis:  same    Procedure: pap smear and take down of labial adhesions    Surgeon: Dr. Durant    Anesthesia: MAC with local anesthetic for paracervical block    EBL: 5mL  UOP: none; voided prior to procedure  Intraoperative medications: 8mL 1% lidocaine    Complications: None apparent  Specimens: pap smear    Findings: normal appearing cervix and vaginal walls, adhesions of labia minora at introitus on posterior aspect    Indications: Nehal Wadsworth  had vaginal delivery 20 and presented with postpartum labial adhesions making it painful to sit and walk around, unable to have intercourse.  Discussed adhesion takedown of labia minora and Pap smear since due for that.  Risks, benefits and alternatives to above stated procedure were discussed. Patient desired to proceed and written informed consent was obtained prior to proceeding.      Procedure: Patient was taken to the OR where she was placed under MAC anesthesia without difficulty.  No antibiotics were given. Once patient was comfortable she was positioned in the dorsal lithotomy position with her legs up in stirrups.   A presurgical pause was completed and the proper patient and procedure were identified.  A sterile speculum was placed in the vagina and the cervix was visualized. Pap smear was obtained and submitted to pathology. She was then prepped and draped in the normal sterile fashion.  With prepping, the labial adhesions started to tear apart and were bleeding.  This area was infiltrated with 1% lidocaine and then scalpel used to take down remaining adhesions.  Electrocautery was used to obtain hemostasis.  3-0 Vicryl locked was used at the base of the adhesions and then in a subcuticular fashion in order to keep introitus open.  Hemostasis was assured.  The patient tolerated the procedure well and  was taken to the PACU for recovery in stable condition. Instrument, needle and sponge counts correct x2.

## 2020-10-13 NOTE — DISCHARGE INSTRUCTIONS
Same-Day Surgery   Adult Discharge Orders & Instructions     For 24 hours after surgery:  1. Get plenty of rest.  A responsible adult must stay with you for at least 24 hours after you leave the hospital.   2. Pain medication can slow your reflexes. Do not drive or use heavy equipment.  If you have weakness or tingling, don't drive or use heavy equipment until this feeling goes away.  3. Mixing alcohol and pain medication can cause dizziness and slow your breathing. It can even be fatal. Do not drink alcohol while taking pain medication.  4. Avoid strenuous or risky activities.  Ask for help when climbing stairs.   5. You may feel lightheaded.  If so, sit for a few minutes before standing.  Have someone help you get up.   6. If you have nausea (feel sick to your stomach), drink only clear liquids such as apple juice, ginger ale, broth or 7-Up.  Rest may also help.  Be sure to drink enough fluids.  Move to a regular diet as you feel able. Take pain medications with a small amount of solid food, such as toast or crackers, to avoid nausea.   7. A slight fever is normal. Call the doctor if your fever is over 100 F (37.7 C) (taken under the tongue) or lasts longer than 24 hours.  8. You may have a dry mouth, muscle aches, trouble sleeping or a sore throat.  These symptoms should go away after 24 hours.  9. Do not make important or legal decisions.   Pain Management:      1. Take pain medication (if prescribed) for pain as directed by your physician.        2. WARNING: If the pain medication you have been prescribed contains Tylenol  (acetaminophen), DO NOT take additional doses of Tylenol (acetaminophen).     Call your doctor for any of the followin.  Signs of infection (fever, growing tenderness at the surgery site, severe pain, a large amount of drainage or bleeding, foul-smelling drainage, redness, swelling).    2.  It has been over 8 to 10 hours since surgery and you are still not able to urinate (pee).    3.   Headache for over 24 hours.    4.  Numbness, tingling or weakness the day after surgery (if you had spinal anesthesia).  To contact a doctor, call _____________________________________ or:      [phone] and ask for the Resident On Call for:          __________________________________________ (answered 24 hours a day)      Emergency Department:  [facility]: [phone]  [facility] [facility]: [phone]               Rev. 10/2014

## 2020-10-13 NOTE — ANESTHESIA PREPROCEDURE EVALUATION
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]  Pap smear for cervical cancer screening [Z12.4]   Procedure(s):  REPAIR, LACERATION, PERINEAL and pap smear     LABS:  CBC:   Lab Results   Component Value Date    WBC 7.0 10/12/2020    WBC 17.6 (H) 08/15/2020    HGB 12.9 10/12/2020    HGB 10.7 (L) 08/15/2020    HCT 38.1 10/12/2020    HCT 31.7 (L) 08/15/2020     10/12/2020     08/15/2020     BMP:   Lab Results   Component Value Date    CR 0.63 08/15/2020     COAGS: No results found for: PTT, INR, FIBR  POC:   Lab Results   Component Value Date    BGM 89 10/13/2020    HCG Negative 10/13/2020     OTHER:   Lab Results   Component Value Date    A1C 5.0 2020    ALT 13 08/15/2020    AST 21 08/15/2020        Preop Vitals    BP Readings from Last 3 Encounters:   10/13/20 115/71   20 118/76   20 127/77    Pulse Readings from Last 3 Encounters:   10/13/20 66   20 75   20 70      Resp Readings from Last 3 Encounters:   10/13/20 20   20 18    SpO2 Readings from Last 3 Encounters:   10/13/20 100%   20 98%   08/15/20 96%      Temp Readings from Last 1 Encounters:   10/13/20 36.6  C (97.9  F) (Oral)    Ht Readings from Last 1 Encounters:   10/13/20 1.6 m (5' 3\")      Wt Readings from Last 1 Encounters:   10/13/20 88.1 kg (194 lb 3.6 oz)    Estimated body mass index is 34.41 kg/m  as calculated from the following:    Height as of this encounter: 1.6 m (5' 3\").    Weight as of this encounter: 88.1 kg (194 lb 3.6 oz).     LDA:  Peripheral IV 10/13/20 Left Wrist (Active)   Site Assessment WDL 10/13/20 1054   Line Status Saline locked 10/13/20 1054   Phlebitis Scale 0-->no symptoms 10/13/20 1054   Infiltration Scale 0 10/13/20 1054   Infiltration Site Treatment Method  None 10/13/20 1054   If infiltrated, was a " Vesicant infusing? No 10/13/20 1054   Number of days: 0        History reviewed. No pertinent past medical history.   Past Surgical History:   Procedure Laterality Date     EYE SURGERY      lazy eye correction     HC TOOTH EXTRACTION W/FORCEP  2008      Allergies   Allergen Reactions     No Clinical Screening - See Comments      MMR-got lazy eye.        Anesthesia Evaluation     .             ROS/MED HX    ENT/Pulmonary:  - neg pulmonary ROS     Neurologic:  - neg neurologic ROS   (+)seizures     Cardiovascular:  - neg cardiovascular ROS       METS/Exercise Tolerance:     Hematologic:  - neg hematologic  ROS       Musculoskeletal:  - neg musculoskeletal ROS       GI/Hepatic:  - neg GI/hepatic ROS       Renal/Genitourinary:  - ROS Renal section negative       Endo:  - neg endo ROS       Psychiatric:  - neg psychiatric ROS       Infectious Disease:  - neg infectious disease ROS       Malignancy:      - no malignancy   Other:                         PHYSICAL EXAM:   Mental Status/Neuro: A/A/O   Airway: Facies: Feasible  Mallampati: II  Mouth/Opening: Full  TM distance: > 6 cm  Neck ROM: Full   Respiratory: Auscultation: CTAB     Resp. Rate: Normal     Resp. Effort: Normal      CV: Rhythm: Regular  Rate: Age appropriate  Heart: Normal Sounds  Edema: None   Comments:      Dental: Normal Dentition                Assessment:   ASA SCORE: 2      Smoking Status:  Non-Smoker/Unknown   NPO Status: NPO Appropriate     Plan:   Anes. Type:  General   Pre-Medication: None   Induction:  IV (Standard)   Airway: Native Airway   Access/Monitoring: PIV   Maintenance: Balanced     Postop Plan:   Postop Pain: Opioids  Postop Sedation/Airway: Not planned     PONV Management:   Adult Risk Factors: Female, Non-Smoker, Postop Opioids   Prevention: Ondansetron, Dexamethasone                   Rolanda Alves MD

## 2020-10-14 NOTE — ANESTHESIA POSTPROCEDURE EVALUATION
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     Anesthesia Type: General          Postop Pain Control: Uneventful            Sign Out: Well controlled pain   PONV: No   Neuro/Psych: Uneventful            Sign Out: Acceptable/Baseline neuro status   Airway/Respiratory: Uneventful            Sign Out: Acceptable/Baseline resp. status   CV/Hemodynamics: Uneventful            Sign Out: Acceptable CV status   Other NRE: NONE   DID A NON-ROUTINE EVENT OCCUR? No         Last Anesthesia Record Vitals:  CRNA VITALS  10/13/2020 1144 - 10/13/2020 1244      10/13/2020             Resp Rate (observed):  (!) 1          Last PACU Vitals:  Vitals Value Taken Time   /62 10/13/20 1217   Temp 36.6  C (97.8  F) 10/13/20 1217   Pulse 66 10/13/20 1217   Resp 16 10/13/20 1217   SpO2 97 % 10/13/20 1217   Temp src     NIBP     Pulse     SpO2     Resp     Temp     Ht Rate     Temp 2           Electronically Signed By: Rolanda Alves MD, 2020, 12:40 PM

## 2020-10-16 ENCOUNTER — MEDICAL CORRESPONDENCE (OUTPATIENT)
Dept: HEALTH INFORMATION MANAGEMENT | Facility: CLINIC | Age: 30
End: 2020-10-16

## 2020-10-16 LAB
COPATH REPORT: NORMAL
PAP: NORMAL

## 2020-10-19 LAB
FINAL DIAGNOSIS: NORMAL
HPV HR 12 DNA CVX QL NAA+PROBE: NEGATIVE
HPV16 DNA SPEC QL NAA+PROBE: NEGATIVE
HPV18 DNA SPEC QL NAA+PROBE: NEGATIVE
SPECIMEN DESCRIPTION: NORMAL
SPECIMEN SOURCE CVX/VAG CYTO: NORMAL

## 2020-10-29 ENCOUNTER — OFFICE VISIT (OUTPATIENT)
Dept: OBGYN | Facility: CLINIC | Age: 30
End: 2020-10-29
Payer: COMMERCIAL

## 2020-10-29 VITALS
HEART RATE: 76 BPM | WEIGHT: 192.1 LBS | BODY MASS INDEX: 34.04 KG/M2 | SYSTOLIC BLOOD PRESSURE: 114 MMHG | DIASTOLIC BLOOD PRESSURE: 70 MMHG | OXYGEN SATURATION: 94 % | HEIGHT: 63 IN | TEMPERATURE: 97.2 F

## 2020-10-29 DIAGNOSIS — Z30.011 ORAL CONTRACEPTIVE PRESCRIBED: Primary | ICD-10-CM

## 2020-10-29 PROCEDURE — 99212 OFFICE O/P EST SF 10 MIN: CPT | Performed by: OBSTETRICS & GYNECOLOGY

## 2020-10-29 RX ORDER — ACETAMINOPHEN AND CODEINE PHOSPHATE 120; 12 MG/5ML; MG/5ML
0.35 SOLUTION ORAL DAILY
Qty: 112 TABLET | Refills: 3 | Status: SHIPPED | OUTPATIENT
Start: 2020-10-29 | End: 2021-09-01

## 2020-10-29 ASSESSMENT — MIFFLIN-ST. JEOR: SCORE: 1555.49

## 2020-10-31 NOTE — PROGRESS NOTES
"DEB Wadsworth is a 30 year old female presents for post operative check. She is  2  week(s) status post perineal skin bridge repair.  She reports doing well and denies significant pain or bleeding.  Feels better than before surgery but not quite healed    O.  Blood pressure 114/70, pulse 76, temperature 97.2  F (36.2  C), temperature source Oral, height 1.6 m (5' 3\"), weight 87.1 kg (192 lb 1.6 oz), SpO2 94 %, currently breastfeeding.    Introitus with a few hanging sutures which were clipped and removed.  1 small 3 mm area with stitch almost dissolved, granulation tissue.  No evidence of infection    A. /P.  (Z30.011) Oral contraceptive prescribed  (primary encounter diagnosis)  Comment: Healing from surgery  Plan: norethindrone (MICRONOR) 0.35 MG tablet        Since she is breast-feeding we will start the minipill and discussed should be completely healed in the next 1 week.  Okay for intercourse in 2 weeks and let me know if any problems.       Follow up prn or when due for next annual exam.    Carol Durant MD  "

## 2020-11-13 ENCOUNTER — VIRTUAL VISIT (OUTPATIENT)
Dept: FAMILY MEDICINE | Facility: OTHER | Age: 30
End: 2020-11-13

## 2020-11-13 NOTE — PROGRESS NOTES
"Date: 2020 14:48:28  Clinician: Lyle Estrella  Clinician NPI: [id number]  Patient: Nehal Wadsworth  Patient : 1990  Patient Address: [address] [place] [id number]  Patient Phone: [phone]  Visit Protocol: URI  Patient Summary:  Nehal is a 30 year old ( : 1990 ) female who initiated a [facility] Visit for COVID-19 (Coronavirus) evaluation and screening. When asked the question \"Please sign me up to receive news, health information and promotions. \", Nehal responded \"No\".    Nehal states her symptoms started 1-2 days ago.   Her symptoms consist of facial pain or pressure, myalgia, chills, malaise, a sore throat, and a headache. Nehal also feels feverish.   Symptom details     Sore throat: Nehal reports having moderate throat pain (4-6 on a 10 point pain scale), does not have exudate on her tonsils, and can swallow liquids. She is not sure if the lymph nodes in her neck are enlarged. A rash has not appeared on the skin since the sore throat started.     Temperature: Her current temperature is 98.6 degrees Fahrenheit.     Facial pain or pressure: The facial pain or pressure does not feel worse when bending or leaning forward.     Headache: She states the headache is moderate (4-6 on a 10 point pain scale).      Nehal denies having vomiting, rhinitis, teeth pain, ageusia, diarrhea, ear pain, wheezing, cough, nasal congestion, nausea, and anosmia. She also denies taking antibiotic medication in the past month, having recent facial or sinus surgery in the past 60 days, and having a sinus infection within the past year. She is not experiencing dyspnea.   Precipitating events  Nehal is not sure if she has been exposed to someone with strep throat. She has not recently been exposed to someone with influenza. Nehal has been in close contact with the following high risk individuals: children under the age of 5.   Pertinent COVID-19 (Coronavirus) information  Nehal does not work or volunteer as healthcare " worker or a . In the past 14 days, Nehal has not worked or volunteered at a healthcare facility or group living setting.   In the past 14 days, she also has not lived in a congregate living setting.   Nehal has not had a close contact with a laboratory-confirmed COVID-19 patient within 14 days of symptom onset.    Since December 2019, Nehal has been tested for COVID-19 and has had upper respiratory infection (URI) or influenza-like illness.      Result of COVID-19 test: Negative     Date of her COVID-19 test: 10/09/2020     Date(s) of previous URI or influenza-like illness (free-text): February     Symptoms Nehal experienced during previous URI or influenza-like illness as reported by the patient (free-text): Sore throat, chills, aches, stuffy nose for 3 days        Pertinent medical history  Nehal typically gets a yeast infection when she takes antibiotics. She has used fluconazole (Diflucan) to treat previous yeast infections. 1 dose of fluconazole (Diflucan) has typically been sufficient for symptoms to resolve in the past.   Nehal needs a return to work/school note.   Weight: 185 lbs   Nehal does not smoke or use smokeless tobacco.   She denies pregnancy and denies breastfeeding. Her last period was over a month ago.   Weight: 185 lbs    MEDICATIONS: Ortho Micronor oral, ALLERGIES: NKDA  Clinician Response:  Dear Nehal,   Your symptoms show that you may have coronavirus (COVID-19). This illness can cause fever, cough and trouble breathing. Many people get a mild case and get better on their own. Some people can get very sick.  Because you also reported sore throat I would like to also test you for Strep Throat to determine if we need to treat you for that as well.  What should I do?  We would like to test you for the COVID-19 virus and the streptococcus bacteria.   1. Please call [phone] to schedule your visit. Explain that you were referred by [facility] to have a COVID-19 test and a Strep test. Be  "ready to share your [facility] visit ID number.  * If you need to schedule in [facility] please call [phone] or for [employer] employees please call [phone]  The following will serve as your written order for the COVID Test, ordered by me, for the indication of suspected COVID [Z20.828]: The test will be ordered in [employer], our electronic health record, after you are scheduled. It will show as ordered and authorized by Rancho Lopez MD.  Order: COVID-19 (Coronavirus) PCR for SYMPTOMATIC testing from [facility].  The following will serve as your written order for this Strep Test, ordered by me, for the indication of suspected Strep throat [R07.0]: The test will be ordered in [employer], our electronic health record, after you are scheduled. It will show as ordered and authorized by Rancho Lopez MD.  Order: Streptococcus A Rapid Screen with reflex to PCR testing from [facility].  2. When it's time for your COVID and Strep test:  Stay at least 6 feet away from others. (If someone will drive you to your test, stay in the backseat, as far away from the  as you can.)  Cover your mouth and nose with a mask, tissue or washcloth.  Go straight to the testing site. Don't make any stops on the way there or back.  3.Starting now: Stay home and away from others (self-isolate) until:  You've had no fever---and no medicine that reduces fever---for one full day (24 hours). And...  Your other symptoms have gotten better. For example, your cough or breathing has improved. And...  At least 10 days have passed since your symptoms started.  During this time, don't leave the house except for testing or medical care.  Stay in your own room, even for meals. Use your own bathroom if you can.  Stay away from others in your home. No hugging, kissing or shaking hands. No visitors.  Don't go to work, school or anywhere else.  Clean \"high touch\" surfaces often (doorknobs, counters, handles, etc.). Use a household cleaning spray or wipes. You'll find " a full list of  on the EPA website: www.epa.gov/pesticide-registration/list-n-disinfectants-use-against-sars-cov-2.  Cover your mouth and nose with a mask, tissue or washcloth to avoid spreading germs.  Wash your hands and face often. Use soap and water.  Caregivers in these groups are at risk for severe illness due to COVID-19:  o People 65 years and older  o People who live in a nursing home or long-term care facility  o People with chronic disease (lung, heart, cancer, diabetes, kidney, liver, immunologic)  o People who have a weakened immune system, including those who:  Are in cancer treatment  Take medicine that weakens the immune system, such as corticosteroids  Had a bone marrow or organ transplant  Have an immune deficiency  Have poorly controlled HIV or AIDS  Are obese (body mass index of 40 or higher)  Smoke regularly  o Caregivers should wear gloves while washing dishes, handling laundry and cleaning bedrooms and bathrooms.  o Use caution when washing and drying laundry: Don't shake dirty laundry, and use the warmest water setting that you can.  o For more tips, go to www.cdc.gov/coronavirus/2019-ncov/downloads/10Things.pdf.  4.Sign up for [facility] app. We know it's scary to hear that you might have COVID-19. We want to track your symptoms to make sure you're okay over the next 2 weeks. Please look for an email from [facility] app---this is a free, online program that we'll use to keep in touch. To sign up, follow the link in the email. Learn more at [email or web address]  How can I take care of myself?  Get lots of rest. Drink extra fluids (unless a doctor has told you not to).  Take Tylenol (acetaminophen) for fever or pain. If you have liver or kidney problems, ask your family doctor if it's okay to take Tylenol.  Adults can take either:  650 mg (two 325 mg pills) every 4 to 6 hours, or...  1,000 mg (two 500 mg pills) every 8 hours as needed.  Note: Don't take more than 3,000 mg in  one day. Acetaminophen is found in many medicines (both prescribed and over-the-counter medicines). Read all labels to be sure you don't take too much.  For children, check the Tylenol bottle for the right dose. The dose is based on the child's age or weight.  If you have other health problems (like cancer, heart failure, an organ transplant or severe kidney disease): Call your specialty clinic if you don't feel better in the next 2 days.  Know when to call 911. Emergency warning signs include:  Trouble breathing or shortness of breath  Pain or pressure in the chest that doesn't go away  Feeling confused like you haven't felt before, or not being able to wake up  Bluish-colored lips or face.  Where can I get more information?  [facility] -- About COVID-19: [email or web address]  CDC -- What to Do If You're Sick: www.cdc.gov/coronavirus/2019-ncov/about/steps-when-sick.html  CDC -- Ending Home Isolation: www.cdc.gov/coronavirus/2019-ncov/hcp/disposition-in-home-patients.html  CDC -- Caring for Someone: www.cdc.gov/coronavirus/2019-ncov/if-you-are-sick/care-for-someone.html  [facility] -- Interim Guidance for Hospital Discharge to Home: www.health.[facility].mn.us/diseases/coronavirus/hcp/hospdischarge.pdf  [facility] clinical trials (COVID-19 research studies): [email or web address]-trials  Below are the COVID-19 hotlines at the [facility] ([facility]). Interpreters are available.  For health questions: Call 695-493-6459 or 1-322.491.8667 (7 a.m. to 7 p.m.)  For questions about schools and childcare: Call 638-235-6680 or 1-312.235.2459 (7 a.m. to 7 p.m.)       Diagnosis: Acute pharyngitis, unspecified  Diagnosis ICD: J02.9

## 2020-11-14 ENCOUNTER — AMBULATORY - HEALTHEAST (OUTPATIENT)
Dept: FAMILY MEDICINE | Facility: CLINIC | Age: 30
End: 2020-11-14

## 2020-11-14 DIAGNOSIS — Z20.822 SUSPECTED COVID-19 VIRUS INFECTION: ICD-10-CM

## 2020-11-14 DIAGNOSIS — R07.0 THROAT PAIN: ICD-10-CM

## 2020-11-14 LAB — DEPRECATED S PYO AG THROAT QL EIA: NORMAL

## 2020-11-15 LAB — GROUP A STREP BY PCR: NORMAL

## 2020-11-16 ENCOUNTER — COMMUNICATION - HEALTHEAST (OUTPATIENT)
Dept: SCHEDULING | Facility: CLINIC | Age: 30
End: 2020-11-16

## 2020-12-11 ENCOUNTER — MYC MEDICAL ADVICE (OUTPATIENT)
Dept: OBGYN | Facility: CLINIC | Age: 30
End: 2020-12-11

## 2020-12-11 DIAGNOSIS — Z30.011 ORAL CONTRACEPTIVE PRESCRIBED: Primary | ICD-10-CM

## 2020-12-11 RX ORDER — NORETHINDRONE ACETATE AND ETHINYL ESTRADIOL .03; 1.5 MG/1; MG/1
1 TABLET ORAL DAILY
Qty: 84 TABLET | Refills: 3 | Status: SHIPPED | OUTPATIENT
Start: 2020-12-11 | End: 2022-12-09

## 2020-12-11 NOTE — TELEPHONE ENCOUNTER
Pt is done breastfeeding and needs new OCP.  Per james, she would like something that has the same level of hormone throughout the month, not one that changes weekly.  Waiting for pharmacy info.  Alejandra Driver RN

## 2021-03-18 ENCOUNTER — MEDICAL CORRESPONDENCE (OUTPATIENT)
Dept: HEALTH INFORMATION MANAGEMENT | Facility: CLINIC | Age: 31
End: 2021-03-18

## 2021-05-26 ASSESSMENT — PATIENT HEALTH QUESTIONNAIRE - PHQ9: SUM OF ALL RESPONSES TO PHQ QUESTIONS 1-9: 4

## 2021-05-28 NOTE — TELEPHONE ENCOUNTER
Informed pt the only two options for holter monitors are 24 hours or 1 month. PCP states will monitor for 24 hours see what results are and go from there. Thank you.

## 2021-05-28 NOTE — PROGRESS NOTES
"Assessment and Plan    1. Irregular heart beat  Holter Monitor ordered    2. Depression with anxiety  Well controlled on: Lexapro.  Not due for labs    Further follow up pending Holter monitor results, otherwise   Return in about 6 months (around 11/2/2019) for or earlier as needed.    Stefanie Patel MD     -------------------------------------------    Chief Complaint   Patient presents with     Palpitations     pt states increase heart palpitations, discuss heart murmur      From visit 11/16/18    Antonio was seen in [facility] ED 10/24/18 for chest pain - EKG, CXR and D-dimer all normal, urged to follow up with PCP. Claire also quit smoking after this visit     Day before she went into the ER she took a deep breath and had a terrible pain shooting into shoulder blade area.. On and off throughout the day, occurred only with breath inhalations.  No movements would bring it on. It occurred a couple if times an hour at first . No it occurs maybe once or twice a day.  Quit smoking. No new activities, no recent illness - she does carry heavy text books sometimes. Has been sneezy last few days. Felt a little nauseated at the time.  No  back pain recently.  She has some neck pain once in a while. She may have been a little short of breath during the pain - less so now.      If your chest pain doesn't go away, comes back worse, or is ever associated with light-headedness or shortness of breath, please come back AND we will do further work up (echocardiogram)    ----    TODAY: Antonio notices heart beating \"deeper\" - would notice after eating sugar - tried to decrease sugar and this didn't make a difference.  She has gone a week without drinking coffee and notices the deep heart beats slightly  less this week.  It is one or multiple - most often multiple.  Feels a little harder to breathe during episodes - but she thinks it might just be that she is \"taken aback\" by what's going on in chest.    - No chest pain   -  No change in " tolerance of exercise   - has been trying to smoking on own - better exercise tolerance.     - she wore  a fit bit for a while - too much anxiety  ----    Depression with Anxiety - she is feeling well on current dose of escitalopram    Little interest or pleasure in doing things: Not at all  Feeling down, depressed, or hopeless: Not at all  Trouble falling or staying asleep, or sleeping too much: Several days  Feeling tired or having little energy: Several days  Poor appetite or overeating: Not at all  Feeling bad about yourself - or that you are a failure or have let yourself or your family down: Not at all  Trouble concentrating on things, such as reading the newspaper or watching television: Not at all  Moving or speaking so slowly that other people could have noticed. Or the opposite - being so fidgety or restless that you have been moving around a lot more than usual: Not at all  Thoughts that you would be better off dead, or of hurting yourself in some way: Not at all  PHQ-9 Total Score: 2      Current Outpatient Medications on File Prior to Visit   Medication Sig Dispense Refill     aspirin 325 MG tablet Take 325 mg by mouth as needed for pain.       escitalopram oxalate (LEXAPRO) 10 MG tablet Take 1 tablet (10 mg total) by mouth daily. 90 tablet 1     No current facility-administered medications on file prior to visit.          There are no preventive care reminders to display for this patient.  Health Maintenance reviewed - .    The history section was last reviewed by Faraz Romo CMA on May 2, 2019.    Social History     Tobacco Use   Smoking Status Former Smoker     Types: Cigarettes     Last attempt to quit: 2019     Years since quittin.0   Smokeless Tobacco Never Used   Tobacco Comment    7-10 cigarettes a week       Social History     Substance and Sexual Activity   Alcohol Use Yes     Alcohol/week: 0.6 - 1.8 oz     Types: 1 - 3 Glasses of wine per week         Vitals:    19 0834   BP:  108/70   Pulse: 68   Resp: 16     Body mass index is 30.92 kg/m .     EXAM:    General appearance - alert, well appearing, and in no distress  Mental status - normal mood, behavior, speech, dress, motor activity, and thought processes  Chest - clear to auscultation, no wheezes, rales or rhonchi, symmetric air entry  Heart - normal rate, regular rhythm, normal S1, S2, no murmurs, rubs, clicks or gallops

## 2021-05-30 NOTE — TELEPHONE ENCOUNTER
Refill Approved    Rx renewed per Medication Renewal Policy. Medication was last renewed on 10/21/2018 for 90/1.  Last OV 5/2/2019  Meri Cisneros, [facility] Triage/Med Refill 7/22/2019     Requested Prescriptions   Pending Prescriptions Disp Refills     escitalopram oxalate (LEXAPRO) 10 MG tablet [Pharmacy Med Name: ESCITALOPRAM 10 MG TABLET] 90 tablet 1     Sig: TAKE 1 TABLET BY MOUTH EVERY DAY       SSRI Refill Protocol  Passed - 7/22/2019  1:29 AM        Passed - PCP or prescribing provider visit in last year     Last office visit with prescriber/PCP: 5/2/2019 Stefanie Patel MD OR same dept: 5/2/2019 Stefanie Patel MD OR same specialty: 5/2/2019 Stefanie Patel MD  Last physical: 9/22/2017 Last MTM visit: Visit date not found   Next visit within 3 mo: Visit date not found  Next physical within 3 mo: Visit date not found  Prescriber OR PCP: Stefanie Patel MD  Last diagnosis associated with med order: 1. Depression with anxiety  - escitalopram oxalate (LEXAPRO) 10 MG tablet [Pharmacy Med Name: ESCITALOPRAM 10 MG TABLET]; TAKE 1 TABLET BY MOUTH EVERY DAY  Dispense: 90 tablet; Refill: 1    If protocol passes may refill for 12 months if within 3 months of last provider visit (or a total of 15 months).

## 2021-05-31 VITALS — BODY MASS INDEX: 28.72 KG/M2 | WEIGHT: 166 LBS

## 2021-05-31 VITALS — BODY MASS INDEX: 28.2 KG/M2 | WEIGHT: 163 LBS

## 2021-05-31 VITALS — BODY MASS INDEX: 27.66 KG/M2 | WEIGHT: 162 LBS | HEIGHT: 64 IN

## 2021-06-01 VITALS — BODY MASS INDEX: 29.76 KG/M2 | WEIGHT: 172 LBS

## 2021-06-02 VITALS — BODY MASS INDEX: 30.62 KG/M2 | WEIGHT: 177 LBS

## 2021-06-03 VITALS — HEIGHT: 64 IN | BODY MASS INDEX: 30.52 KG/M2 | WEIGHT: 178.75 LBS

## 2021-06-04 VITALS
SYSTOLIC BLOOD PRESSURE: 120 MMHG | WEIGHT: 178 LBS | BODY MASS INDEX: 30.79 KG/M2 | DIASTOLIC BLOOD PRESSURE: 80 MMHG | HEART RATE: 68 BPM

## 2021-06-04 NOTE — PATIENT INSTRUCTIONS - HE
"Reminders for pregnancy  -Try taking a prenatal vitamin (or any vitamin with 0.8 mg folate)  AT NIGHT  -Avoid drinking fluid first thing in the am - this may help with nausea. Eat small meals.    -Acetaminophen is OK for pain, but ibuprofen is not.  Also, check with a pharmacist or medical provider before taking cold medication (you should not take deongestants, for instance)  -it's OK to exercise as long as you are not at risk for abdominal trauma (no soccer or rugby)  -If you develop severe nausea or vomiting before seeing your midwife ob-gyn, I can prescribe a medication for this  - think about and discuss you an your partner feelings about prenatal testing, and how you all would respond to any \"positive\" tests (as in, for tests for Down Syndrome)  "

## 2021-06-04 NOTE — PROGRESS NOTES
"Assessment and Plan    1. Pregnancy test performed, pregnancy confirmed  - Pregnancy (Beta-hCG, Qual), Urine   - She lives in [place] - I looked up midwife options in the [facility] system, noting that the see patients at clinics in [place], and in [place] - discussed first visit between 8-12 weeks      Patient Instructions   Reminders for pregnancy  -Try taking a prenatal vitamin (or any vitamin with 0.8 mg folate)  AT NIGHT  -Avoid drinking fluid first thing in the am - this may help with nausea. Eat small meals.    -Acetaminophen is OK for pain, but ibuprofen is not.  Also, check with a pharmacist or medical provider before taking cold medication (you should not take deongestants, for instance)  -it's OK to exercise as long as you are not at risk for abdominal trauma (no soccer or rugby)  -If you develop severe nausea or vomiting before seeing your midwife ob-gyn, I can prescribe a medication for this  - think about and discuss you an your partner feelings about prenatal testing, and how you all would respond to any \"positive\" tests (as in, for tests for Down Syndrome)      Return in about 1 year (around 12/20/2020) for Annual physical, or earlier as needed.         Stefanie Patel MD     -------------------------------------------    Chief Complaint   Patient presents with     Confirmation     pregnancy      Antonio [facility] LMP 11/1/19, making her  7 weeks pregnant.    She used to take Lexapro for depression with anxiety, but stopped before she found out she was pregnant.  She has a less stressful job now, which did much to alleviate her symptoms : she is  at [employer] .  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       Current Outpatient Medications on " File Prior to Visit   Medication Sig Dispense Refill     prenatal no115-iron-folic acid 29 mg iron- 1 mg Chew Chew daily.       No current facility-administered medications on file prior to visit.          Health Maintenance Due   Topic Date Due     HIV SCREENING  2004     PREVENTIVE CARE VISIT  2018     Health Maintenance reviewed - .    The history section was last reviewed by Stefanie Patel MD on Dec 22, 2019.    Social History     Tobacco Use   Smoking Status Former Smoker     Types: Cigarettes     Last attempt to quit: 2019     Years since quittin.7   Smokeless Tobacco Never Used   Tobacco Comment    7-10 cigarettes a week       Social History     Substance and Sexual Activity   Alcohol Use Not Currently     Frequency: Never         Vitals:    19 0858   BP: 120/80   Pulse: 68     Body mass index is 30.79 kg/m .     EXAM:    General appearance - alert, well appearing, and in no distress  Mental status - normal mood, behavior, speech, dress, motor activity, and thought processes

## 2021-06-12 NOTE — PROGRESS NOTES
"Nehal Wadsworth is a 30 y.o. female who is being evaluated via a billable video visit.      The patient has been notified of following:     \"This video visit will be conducted via a call between you and your physician/provider. We have found that certain health care needs can be provided without the need for an in-person physical exam.  This service lets us provide the care you need with a video conversation.  If a prescription is necessary we can send it directly to your pharmacy.  If lab work is needed we can place an order for that and you can then stop by our lab to have the test done at a later time.    Video visits are billed at different rates depending on your insurance coverage. Please reach out to your insurance provider with any questions.    If during the course of the call the physician/provider feels a video visit is not appropriate, you will not be charged for this service.\"    Patient has given verbal consent to a Video visit? Yes  How would you like to obtain your AVS? AVS Preference: E-Mail (Inform patient AVS not encrypted with this option).  If dropped by the video visit, the video invitation should be sent to: Send to e-mail at: [email or web address]  Will anyone else be joining your video visit? No    Chief Complaint   Patient presents with     Nevus     right breast      Antonio has an almost 2 month old son. He just started sleeping 6 hours a night    She found this nevus when she was breast feeding a week or two ago   - raised   - not symmetrical   - has two colors - light brown and dark brown   - she has not had other moles or removals before   - no family history of skin cancer    Just finished maternity leave - will have 5 more week 'bonding' time now    Objective  General appearance - alert, well appearing, and in no distress  Mental status - normal mood, behavior, speech, dress, motor activity, and thought processes  Chest - appears to be breathing normally and without labor  Skin - " raised,generally circular nevus - light and dark brown (but not purple or black), looks to be less than 5 mm     Assessment and Plan  1. Melanocytic nevus of trunk  By history and appearance this seems to be normal, though admittedly video image is not ideal.  Asked her to send me a picture and also to contact me again if this is getting larger    Video-Visit Details    Type of service:  Video Visit  Video Start Time: 1:28 PM  Video End Time (time video stopped): 1:35 PM  Originating Location (pt. Location): Home    Distant Location (provider location):  [facility]     Platform used for Video Visit: Philly Patel MD

## 2021-06-13 NOTE — PROGRESS NOTES
"Assessment and Plan    1. Routine general medical examination at a health care facility    2. Depression with anxiety  escitalopram oxalate (LEXAPRO) 10 MG tablet 30 tablet 2 9/22/2017  --      Sig - Route: Take 1 tablet (10 mg total) by mouth daily. - Oral     E-Prescribing Status: Receipt confirmed by pharmacy (9/22/2017 10:44 AM CDT)     Please return to the clinic in one month to re-asses depression    3. Screening for STD (sexually transmitted disease)  - Chlamydia trachomatis & Neisseria gonorrhoeae, Amplified Detection    4. Papanicolaou smear for cervical cancer screening  - Gynecologic Cytology (PAP Smear)    5. Screening, lipid  - Lipid Cascade    6. Screening for diabetes mellitus  - Glucose; Future  - Glucose    7. Family history of diabetes mellitus (DM)  - Glucose; Future  - Glucose    8. Family history of hyperlipidemia  - Lipid Cascade     OK steroid cream later if dermatitis on right hand gets worse    counseled the patient for tobacco cessation  Counseling:  Preventive maintenance  Diet - calcium  Exercise  Breast self exam  - NOT \"looking for lumps\" but getting to know what is your normal and being aware of changes      Stefanie Patel MD    -----------------      Do you have any concerns today?     Anxiety and depression: Antonio would like-wants to star medication for addressing these issues.  A long time ago was on prozac in high school and college. This did not help, so she stopped.  Last May she had a pretty bad panic attack - was prescribed Lexapro but didn't take it.  Boyfriend had cancer a few years ago - testicular    She is trying to quit smoking    Has bumps of dorsum of right hand, thought it was an allergic reaction but it's not-saw a MD and told to use a steroid cream.    Habits  Exercise: tries too, but not great - lost 22 pounds this spring, gained 5 back  Diet: a lot of veggies  Do you take any herbs or supplements that were not prescribed by a doctor? no  Are you taking calcium " supplements? no  Are you taking aspirin daily? sometimes  Do you wear seat belts? Yes  Do you bike or ride a motorcycle? Do you wear a helmet? 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   for  [employer]     History:  LMP: Patient's last menstrual period was 2017.  Last pap date:   Abnormal pap? no  : 0  Are you sexually active? yes, somewhat  Do you need to use family planning? no - she and her boyfriend figure it's unlikely given his cancer, but they are OK if she does get pregnant        Preventive  BP Readings from Last 3 Encounters:   17 98/70       Last Td/Tdap - 2016      Patient Active Problem List   Diagnosis     Depression with anxiety         Current Outpatient Prescriptions:      aspirin 325 MG tablet, Take 325 mg by mouth as needed for pain., Disp: , Rfl:      escitalopram oxalate (LEXAPRO) 10 MG tablet, Take 1 tablet (10 mg total) by mouth daily., Disp: 30 tablet, Rfl: 2              Review of Systems    Do you have pain that bothers you in your daily life? no    Constitutional: negative for weight change or recent illness  Eyes: negative for change in vision  Ears, nose, mouth, throat, and face: negative for sore throat and nasal drainage  Respiratory: negative for cough or dyspnea  Cardiovascular: negative for chest pain and palpitations  Gastrointestinal: negative for abdominal pain and change in bowel habits  Genitourinary:negative for dysuria  Breast: negative for lumps or pains  Integument: no rashes or lesions  Musculoskeletal:negative for arthralgias and myalgias  Neurological: negative for dizziness, headaches and  paresthesia  Behavioral/Psych: negative for depression     Objective:     Vitals:    09/22/17 0938   BP: 98/70   Pulse: 77   Resp: 16   SpO2: 99%     Body mass index is 28.03 kg/(m^2).     General appearance: alert, appears stated age, cooperative, no distress and overweight  Head: Normocephalic, without obvious abnormality, atraumatic  Eyes: conjunctivae/corneas clear. PERRL, EOM's intact. Fundi benign.  Ears: normal TM's and external ear canals both ears  Throat: lips, mucosa, and tongue normal; teeth and gums normal  Neck: no adenopathy, no carotid bruit, no JVD, supple, symmetrical, trachea midline and thyroid not enlarged, symmetric, no tenderness/mass/nodules  Lungs: clear to auscultation bilaterally   Abdominal exam: soft, nontender, nondistended, no masses or organomegaly.  Breasts: normal appearance, no masses or tenderness  Heart: regular rate and rhythm, S1, S2 normal, no murmur, click, rub or gallop  Pelvic: cervix normal in appearance, external genitalia normal, no adnexal masses or tenderness, no cervical motion tenderness, rectovaginal septum normal, uterus normal size, shape, and consistency and vagina normal without discharge  Extremities: extremities normal, atraumatic, no cyanosis or edema  Pulses: 2+ and symmetric  Skin: no rashes or worrsiome lesions; rough slightly pigmented circular patch on dorsum of right hand  Neurologic: Grossly normal

## 2021-06-13 NOTE — PROGRESS NOTES
"Assessment and Plan    1. Depression with anxiety  - escitalopram oxalate (LEXAPRO) 10 MG tablet; Take 1 tablet (10 mg total) by mouth daily.  Dispense: 90 tablet; Refill: 1     Discussed expectation that she would stay on medication at least 6 months (barring some important reason to stop it), and then OK to try and wean herself of IF she feels in a good spot to do this    Stefanie Patel MD     -------------------------------------------    Chief Complaint   Patient presents with     Follow-up     depression and anxiety     From last visit on 9/22/17:  \" Anxiety and depression: Antonio would like-wants to start medication for addressing these issues.  A long time ago was on Prozac in high school and college. This did not help, so she stopped.  Last May she had a pretty bad panic attack - was prescribed Lexapro but didn't take it.  Boyfriend had cancer a few years ago - testicular.\"     At that visit I started Nehal on Lexapro.  At the very beginning she was nauseated for a few days and head was fuzzy. She feels better if she takes it with dinner. She says it has made a noticeable difference in how she feels, though she still dislikes her current job and nothing can change that.  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 Or the opposite - being so fidgety or restless that you " have been moving around a lot more than usual: Not at all  Thoughts that you would be better off dead, or of hurting yourself in some way: Not at all  PHQ-9 Total Score: 6  Note: score at last visit was 10    Patient Active Problem List   Diagnosis     Depression with anxiety       Current Outpatient Prescriptions on File Prior to Visit   Medication Sig Dispense Refill     aspirin 325 MG tablet Take 325 mg by mouth as needed for pain.       No current facility-administered medications on file prior to visit.            There are no preventive care reminders to display for this patient.      History   Smoking Status     Smoker, Current Status Unknown     Types: Cigarettes   Smokeless Tobacco     Never Used     Comment: 7-10 cigarettes a week       History   Alcohol Use     0.6 - 1.8 oz/week     1 - 3 Glasses of wine per week       Vitals:    10/24/17 0757   BP: 102/66   Pulse: 67   SpO2: 98%     Body mass index is 28.2 kg/(m^2).     EXAM:    General appearance - alert, well appearing, and in no distress  Mental status - normal mood, behavior, speech, dress, motor activity, and thought processes

## 2021-06-14 NOTE — PROGRESS NOTES
"Assessment and Plan    1. Neck pain on left side - tight muscles vs. Possibly matted lymph nodes  - US Neck Limited; Future  - cyclobenzaprine (FLEXERIL) 10 MG tablet; Take 0.5 tablets (5 mg total) by mouth every evening.  Dispense: 10 tablet; Refill: 0    2. Adenopathy, cervical   - US Neck Limited; Future    Stefanie Patel MD     -------------------------------------------    Chief Complaint   Patient presents with     Sore Throat     for 6-8 weeks, having neck pain.  Feels a lump on left side behind ear.  Sometimes ear will hurt-feelsl francisco there is water in the past week. Neck pain gotten bad in the last 2 weeks.  Hurts to swallow, sometimes feels scratchy or feels something there that makes it difficult to swallow. Doesn't feel like strep.  Has headaches and stomach aches.      History as above/. One day she just woke up and felt a \"flick\" (as in flicking a piece of cardboard) whenever she swallowed. Neck pain in the area where she feels a lymph node under her ear.  Sometimes a constant pain, sometimes a shooting pain.  Slight cough sometimes since this started, sneezing more , no fever.  Felt a little nauseated sometimes    She has been an on-off smoker      Patient Active Problem List   Diagnosis     Depression with anxiety       Current Outpatient Prescriptions on File Prior to Visit   Medication Sig Dispense Refill     aspirin 325 MG tablet Take 325 mg by mouth as needed for pain.       escitalopram oxalate (LEXAPRO) 10 MG tablet Take 1 tablet (10 mg total) by mouth daily. 90 tablet 1     No current facility-administered medications on file prior to visit.            There are no preventive care reminders to display for this patient.    History   Smoking Status     Smoker, Current Status Unknown     Types: Cigarettes   Smokeless Tobacco     Never Used     Comment: 7-10 cigarettes a week       History   Alcohol Use     0.6 - 1.8 oz/week     1 - 3 Glasses of wine per week         Vitals:    12/05/17 0828   BP: " 108/66   Pulse: 77   Temp: 98.7  F (37.1  C)   SpO2: 98%     Body mass index is 28.72 kg/(m^2).     EXAM:    General appearance - alert, well appearing, and in no distress  Eyes - conjunctiva clear  Ears - bilateral TM's and external ear canals normal  Mouth - mucous membranes moist, pharynx normal without lesions and possibly left tonsil slightly larger than right, but not red and no exudates  Neck - ropy area of pain to palpation left side of neck starting below ear - possible tense muscle, but there is mild irregularity which might suggest matted lymph nodes  Chest - clear to auscultation, no wheezes, rales or rhonchi, symmetric air entry  Heart - normal rate, regular rhythm, normal S1, S2, no murmurs, rubs, clicks or gallops

## 2021-06-17 NOTE — PROGRESS NOTES
Assessment and Plan    1. Depression with anxiety  Well controlled  - escitalopram oxalate (LEXAPRO) 10 MG tablet; Take 1 tablet (10 mg total) by mouth daily.  Dispense: 90 tablet; Refill: 1    2. Benign skin lesion  On right labia majora - this looks like a healing irritation to me; - it does NOT have the appearance of a genital wart, though I am not 100% certain that it is not.  The fact that it is getting smaller on its own also goes against a wart.  I reassured her, but if it grows bigger I would like her to follow up       Return in about 6 months (around 10/17/2018).    Stefanie Patel MD     -------------------------------------------    Chief Complaint   Patient presents with     Mass     on labia on right side for 2 months.  Seems like an ingrown hair, noticed when shaving, sore for 1 month after using witch hazel.     Follow-up     Anirta Calvert is worried about a bump on her right labia majora. At first thought it was ingrown hair - discovered while shaving - tried to pop it - didn't go over well - got bigger (smaller since she stopped messing with it). Witch hazel didn't help.  She just wants to make sure it is not an STD    Also, she needs a refill of her medication for depression and anxiety. In September 2017 I had started Antonio on Lexapro and she says this has worked well and helped her function    Little interest or pleasure in doing things: Not at all  Feeling down, depressed, or hopeless: Several days  Trouble falling or staying asleep, or sleeping too much: Not at all  Feeling tired or having little energy: Several days  Poor appetite or overeating: Several days  Feeling bad about yourself - or that you are a failure or have let yourself or your family down: Not at all  Trouble concentrating on things, such as reading the newspaper or watching television: Not at all  Moving or speaking so slowly that other people could have noticed. Or the opposite - being so fidgety or restless that you have  been moving around a lot more than usual: Several days  Thoughts that you would be better off dead, or of hurting yourself in some way: Not at all  PHQ-9 Total Score: 4      Patient Active Problem List   Diagnosis     Depression with anxiety       Current Outpatient Prescriptions:      [START ON 4/24/2018] escitalopram oxalate (LEXAPRO) 10 MG tablet, Take 1 tablet (10 mg total) by mouth daily., Disp: 90 tablet, Rfl: 1     aspirin 325 MG tablet, Take 325 mg by mouth as needed for pain., Disp: , Rfl:       There are no preventive care reminders to display for this patient.  .    History   Smoking Status     Smoker, Current Status Unknown     Types: Cigarettes   Smokeless Tobacco     Never Used     Comment: 7-10 cigarettes a week       History   Alcohol Use     0.6 - 1.8 oz/week     1 - 3 Glasses of wine per week       Vitals:    04/17/18 1100   BP: 126/78   Pulse: 94   SpO2: 99%     Body mass index is 29.76 kg/(m^2).     EXAM:    General appearance - alert, well appearing, and in no distress  Mental status - normal mood, behavior, speech, dress, motor activity, and thought processes  Skin - there is a 3-4 mm round, uniform, flesh colored papule with a slight bumpy but NOT rough surface on the right labium in hair bearing surface

## 2021-06-20 NOTE — LETTER
Letter by Stefanie Patel MD at      Author: Stefanie Patel MD Service: -- Author Type: --    Filed:  Encounter Date: 12/20/2019 Status: Signed                    My Depression Action Plan  Name: Nehal Wadsworth   Date of Birth 1990  Date: 12/22/2019    My Doctor: Stefanie Patel MD   My Clinic: [facility]  [address] [address]  [phone]          GREEN    ZONE   Good Control    What it looks like:     Things are going generally well. You have normal ups and downs. You may even feel depressed from time to time, but bad moods usually last less than a day.   What you need to do:  1. Continue to care for yourself (see self care plan)  2. Check your depression survival kit and update it as needed  3. Follow your physicians recommendations including any medication.  4. Do not stop taking medication unless you consult with your physician first.           YELLOW         ZONE Getting Worse    What it looks like:     Depression is starting to interfere with your life.     It may be hard to get out of bed; you may be starting to isolate yourself from others.    Symptoms of depression are starting to last most all day and this has happened for several days.     You may have suicidal thoughts but they are not constant.   What you need to do:     1. Call your care team, your response to treatment will improve if you keep your care team informed of your progress. Yellow periods are signs an adjustment may need to be made.     2. Continue your self-care, even if you have to fake it!    3. Talk to someone in your support network    4. Open up your depression survival kit           RED    ZONE Medical Alert - Get Help    What it looks like:     Depression is seriously interfering with your life.     You may experience these or other symptoms: You cant get out of bed most days, cant work or engage in other necessary activities, you have trouble taking care of basic hygiene, or basic  responsibilities, thoughts of suicide or death that will not go away, self-injurious behavior.     What you need to do:  1. Call your care team and request a same-day appointment. If they are not available (weekends or after hours) call your local crisis line, emergency room or 911.            Depression Self Care Plan / Survival Kit    Self-Care for Depression  Heres the deal. Your body and mind are really not as separate as most people think.  What you do and think affects how you feel and how you feel influences what you do and think. This means if you do things that people who feel good do, it will help you feel better.  Sometimes this is all it takes.  There is also a place for medication and therapy depending on how severe your depression is, so be sure to consult with your medical provider and/ or Behavioral Health Consultant if your symptoms are worsening or not improving.     In order to better manage my stress, I will:    Exercise  Get some form of exercise, every day. This will help reduce pain and release endorphins, the feel good chemicals in your brain. This is almost as good as taking antidepressants!  This is not the same as joining a gym and then never going! (they count on that by the way?) It can be as simple as just going for a walk or doing some gardening, anything that will get you moving.      Hygiene   Maintain good hygiene (Get out of bed in the morning, Make your bed, Brush your teeth, Take a shower, and Get dressed like you were going to work, even if you are unemployed).  If your clothes don't fit try to get ones that do.    Diet  I will strive to eat foods that are good for me, drink plenty of water, and avoid excessive sugar, caffeine, alcohol, and other mood-altering substances.  Some foods that are helpful in depression are: complex carbohydrates, B vitamins, flaxseed, fish or fish oil, fresh fruits and vegetables.    Psychotherapy  I agree to participate in Individual Therapy (if  recommended).    Medication  If prescribed medications, I agree to take them.  Missing doses can result in serious side effects.  I understand that drinking alcohol, or other illicit drug use, may cause potential side effects.  I will not stop my medication abruptly without first discussing it with my provider.    Staying Connected With Others  I will stay in touch with my friends, family members, and my primary care provider/team.    Use your imagination  Be creative.  We all have a creative side; it doesnt matter if its oil painting, sand castles, or mud pies! This will also kick up the endorphins.    Witness Beauty  (AKA stop and smell the roses) Take a look outside, even in mid-winter. Notice colors, textures. Watch the squirrels and birds.     Service to others  Be of service to others.  There is always someone else in need.  By helping others we can get out of ourselves and remember the really important things.  This also provides opportunities for practicing all the other parts of the program.    Humor  Laugh and be silly!  Adjust your TV habits for less news and crime-drama and more comedy.    Control your stress  Try breathing deep, massage therapy, biofeedback, and meditation. Find time to relax each day.     My support system    Clinic Contact:  Phone number:    Contact 1:  Phone number:    Contact 2:  Phone number:    Denominational/:  Phone number:    Therapist:  Phone number:    [facility] crisis center:    Phone number:    Other community support:  Phone number:

## 2021-06-21 NOTE — PROGRESS NOTES
"Assessment and Plan    1. Atypical chest pain - improving  Work up in ED negative, no additional red flags revealed today though no obvious cause    2. Depression  Well controlled on escitalopram      Patient Instructions   If your chest pain doesn't go away, comes back worse, or is ever associated with light-headedness or shortness of breath, please come back AND we will do further work up (echocardiogram)    You have a VERY soft, low pitched systolic murmur.   If you want to look this up, it's sort of like a \"mitral regurgitation\" murmur, but softer.  I am NOT WORRIED - I hear a lot of quiet murmurs, and don't get worried unless there are concerning symptoms       Stefanie Patel MD     -------------------------------------------    Chief Complaint   Patient presents with     Follow-up     f/u ER visit      [place]hal [place] [facility] [place] for chest pain - EKG, CXR and D-dimer all normal, urged to follow up with PCP. Claire also quit smoking after this visit    Day before she went into the ER she took a deep breath and had a terrible pain shooting into shoulder blade area.. On and off throughout the day, occurred only with breath inhalations.  No movements would bring it on. It occurred a couple if times an hour at first . No it occurs maybe once or twice a day.  Quit smoking. No new activities, no recent illness - she does carry heavy text books sometimes. Has been sneezy last few days. Felt a little nauseated at the time.  No  back pain recently.  She has some neck pain once in a while. She may have been a little short of breath during the pain - less so now.     Other concerns:  Depression - well controlled on lexapro    Little interest or pleasure in doing things: Not at all  Feeling down, depressed, or hopeless: Not at all  Trouble falling or staying asleep, or sleeping too much: Not at all  Feeling tired or having little energy: Several days  Poor appetite or overeating: Several days  Feeling bad about " yourself - or that you are a failure or have let yourself or your family down: Not at all  Trouble concentrating on things, such as reading the newspaper or watching television: Not at all  Moving or speaking so slowly that other people could have noticed. Or the opposite - being so fidgety or restless that you have been moving around a lot more than usual: Not at all  Thoughts that you would be better off dead, or of hurting yourself in some way: Not at all  PHQ-9 Total Score: 2     Patient Active Problem List   Diagnosis     Depression with anxiety         Current Outpatient Medications:      aspirin 325 MG tablet, Take 325 mg by mouth as needed for pain., Disp: , Rfl:      escitalopram oxalate (LEXAPRO) 10 MG tablet, Take 1 tablet (10 mg total) by mouth daily., Disp: 90 tablet, Rfl: 1          Social History     Tobacco Use   Smoking Status Smoker, Current Status Unknown     Types: Cigarettes   Smokeless Tobacco Never Used   Tobacco Comment    7-10 cigarettes a week       Social History     Substance and Sexual Activity   Alcohol Use Yes     Alcohol/week: 0.6 - 1.8 oz     Types: 1 - 3 Glasses of wine per week         Vitals:    11/16/18 1553   BP: 120/80   Pulse: 71   Resp: 16   SpO2: 99%     Body mass index is 30.62 kg/m .     EXAM:    General appearance - alert, well appearing, and in no distress  Mental status - a little anxious but otherwise normal mood, behavior, speech, dress, motor activity, and thought processes  Chest - clear to auscultation, no wheezes, rales or rhonchi, symmetric air entry.  No pain to palpation over ribs or sternum  Heart - normal rate, regular rhythm, normal S1, S2, no  rubs, clicks or gallops; 2/6 low pitched systolic murmur RUSB

## 2021-08-31 ENCOUNTER — OFFICE VISIT (OUTPATIENT)
Dept: FAMILY MEDICINE | Facility: CLINIC | Age: 31
End: 2021-08-31
Payer: COMMERCIAL

## 2021-08-31 VITALS
HEIGHT: 63 IN | BODY MASS INDEX: 33.35 KG/M2 | HEART RATE: 72 BPM | DIASTOLIC BLOOD PRESSURE: 74 MMHG | SYSTOLIC BLOOD PRESSURE: 124 MMHG | WEIGHT: 188.2 LBS | OXYGEN SATURATION: 99 %

## 2021-08-31 DIAGNOSIS — M25.531 RIGHT WRIST PAIN: ICD-10-CM

## 2021-08-31 DIAGNOSIS — S00.521A BLISTER OF LIP: Primary | ICD-10-CM

## 2021-08-31 PROCEDURE — 99214 OFFICE O/P EST MOD 30 MIN: CPT | Performed by: FAMILY MEDICINE

## 2021-08-31 RX ORDER — FLUTICASONE PROPIONATE 50 MCG
SPRAY, SUSPENSION (ML) NASAL
COMMUNITY
End: 2023-10-01

## 2021-08-31 RX ORDER — LORATADINE 10 MG/1
TABLET ORAL
COMMUNITY
Start: 2021-06-07

## 2021-08-31 ASSESSMENT — MIFFLIN-ST. JEOR: SCORE: 1532.8

## 2021-08-31 NOTE — PATIENT INSTRUCTIONS
" Our goal is to discuss this at least every five years with all patients starting at age 18.  Advanced directives are a document that lets us know who talks for you and what your desires are in a medical situation where you are unable to talk for yourself    Here is a useful web site that includes a link to the honoring choices form (under how do I document my choices?):     [email or web address]    The three must haves for this form are  1) who speaks for you (health care agent if you cannot speak for yourself, and what 'sauceda' they have  2) what interventions you want if yo are permanently unconscious  3) making this legal - either by notary, or by two signatures of witnesses, neither of who is your health care agent    There are other parts to the form that are optional    No rush for this! Just something we are supposed to discuss every so often.    If you complete the form, and can make a pdf of it, you can send me a message with that pdf attached and it will become part of your chart here; if you cannot make an electronic copy to send by [employer], mailing the document is also fine.  You might also consider putting it on your fridge under \"Emergency Instructions.,\" as well as having a copy for yourself and your family.    Please let me know if you have any questions.      --    Radial styloid tenosynovitis or \"DeQuervain \"    If you want that herpes test in th future, send me a [employer] message      "

## 2021-08-31 NOTE — PROGRESS NOTES
"Assessment and Plan    Blister of lip  Felt by her, not seen by , and no longer present. Discussed we could consider checking for Herpes 1.  Beyond this, her episode sounds benign; did occur with stress but not with upper respiratory illness     Right wrist pain  Possible \"Mommy thumb\" (radial styloid tenosynovitis)  - discussed wrist splint with thumb spica - she prefers to purchase this on her own       Patient Instructions    Our goal is to discuss this at least every five years with all patients starting at age 18.  Advanced directives are a document that lets us know who talks for you and what your desires are in a medical situation where you are unable to talk for yourself    Here is a useful web site that includes a link to the honoring choices form (under how do I document my choices?):     [email or web address]    The three must haves for this form are  1) who speaks for you (health care agent if you cannot speak for yourself, and what 'sauceda' they have  2) what interventions you want if yo are permanently unconscious  3) making this legal - either by notary, or by two signatures of witnesses, neither of who is your health care agent    There are other parts to the form that are optional    No rush for this! Just something we are supposed to discuss every so often.    If you complete the form, and can make a pdf of it, you can send me a message with that pdf attached and it will become part of your chart here; if you cannot make an electronic copy to send by [facility], mailing the document is also fine.  You might also consider putting it on your fridge under \"Emergency Instructions.,\" as well as having a copy for yourself and your family.    Please let me know if you have any questions.      --    Radial styloid tenosynovitis or \"DeQuervain \"    If you want that herpes test in th future, send me a [facility] message           Return in about 1 year (around " 8/31/2022) for Routine preventive.       Stefanie Patel MD     -------------------------------------------    Chief concern:      Has not felt good for a long time.  A lot of it was abdominal pain  Related to diet - Nehal cut outgluten and feels better. Feeling stressed but much better    Another concern was mouth sores she developed while working on her diet.   Ate strawberries and mouth got tingly.  Then 3 days later had some wine - got a rough patch that turned into a blister on her upper lip. Two weeks in it popped and some fluid came out.  She had never had had an issue with wine prior to this, but afterwards stopped drinking wine.  All gone now.   couldn't see the blister  but she could feel it.     She note's that son had had 5th disease at that time. No known history of hand/foot/mouth disease    ----    Pain in right wrist along radial side: she developed this after giving birth and relates it to picking up her baby.  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  Her son is in  and dong very well      Current Outpatient Medications   Medication     loratadine (CLARITIN) 10 MG tablet     acetaminophen (TYLENOL) 325 MG tablet     fluticasone (FLONASE) 50 MCG/ACT nasal spray     norethindrone-ethinyl estradiol (MICROGESTIN 1.5/30) 1.5-30 MG-MCG tablet     No current facility-administered medications for this visit.     .    Health Maintenance Due   Topic Date Due     INFLUENZA VACCINE (1) 09/01/2021     PREVENTIVE CARE VISIT  09/29/2021     Health Maintenance reviewed -   The history section was last reviewed by Stefanie Berumen LPN on Aug 31, 2021.    History   Smoking Status     Never Smoker   Smokeless Tobacco     Never Used       Social History    Substance and Sexual Activity      Alcohol use: Not Currently        /74 (BP Location: Left arm, Patient  "Position: Sitting, Cuff Size: Adult Regular)   Pulse 72   Ht 1.6 m (5' 3\")   Wt 85.4 kg (188 lb 3.2 oz)   LMP 08/15/2021   SpO2 99%   BMI 33.34 kg/m    Body mass index is 33.34 kg/m .     EXAM:    General appearance - alert, well appearing, and in no distress  Mental status - normal mood, behavior, speech, dress, motor activity, and thought processes  Mouth - mucous membranes moist, pharynx normal without lesions  Neck - supple, no significant adenopathy  Musculoskeletal - Finkelstein test  - just slightly more sore on right than normal left. Pain to palpation over basal thumb joint and just along distal lateral radius      "

## 2021-09-27 ENCOUNTER — OFFICE VISIT (OUTPATIENT)
Dept: OBGYN | Facility: CLINIC | Age: 31
End: 2021-09-27
Payer: COMMERCIAL

## 2021-09-27 VITALS
HEIGHT: 63 IN | DIASTOLIC BLOOD PRESSURE: 79 MMHG | HEART RATE: 82 BPM | OXYGEN SATURATION: 98 % | BODY MASS INDEX: 33.31 KG/M2 | SYSTOLIC BLOOD PRESSURE: 130 MMHG | WEIGHT: 188 LBS

## 2021-09-27 DIAGNOSIS — N94.9 DISCOMFORT OF VAGINA: Primary | ICD-10-CM

## 2021-09-27 DIAGNOSIS — N92.6 IRREGULAR MENSES: ICD-10-CM

## 2021-09-27 PROCEDURE — 99214 OFFICE O/P EST MOD 30 MIN: CPT | Performed by: OBSTETRICS & GYNECOLOGY

## 2021-09-27 ASSESSMENT — MIFFLIN-ST. JEOR: SCORE: 1531.89

## 2021-09-28 NOTE — PROGRESS NOTES
"CC:  ?  Prolapse  HPI:  Nehal Wadsworth is a 31 year old female Patient's last menstrual period was 09/27/2021.  Currently taking OCP and sometimes will bleed in second week of pill pack in addition to fourth week.  This pill has worked best for her as other formulations have made her feel crazy.    Here today to discuss pelvic prolapse.  Since the birth of her child a year ago, states that her pelvis just feels different and has pressure.  She is afraid that if she has sex, the vagina might get worse.  Spouse would like another child but she is so disturbed by the changes of her vagina that she is afraid of what another birth would do to her.  Had a rough recovery and needed a rerepair of her laceration.    For the last 6 weeks she has changed her diet and had less constipation.  She notes that it feels better and it seems the MiraLAX has been helping.  She is always trended toward constipation.      Allergies: Measles mumps and rubella virus vaccine live    EXAM:  Blood pressure 130/79, pulse 82, height 1.6 m (5' 3\"), weight 85.3 kg (188 lb), last menstrual period 09/27/2021, SpO2 98 %, currently breastfeeding.  General - pleasant female in no acute distress.  Pelvic - EG: normal adult female, BUS: within normal limits, Vagina: well rugated, no discharge, Cervix: no lesions or CMT, Uterus: firm, normal sized and nontender, Adnexae: no masses or tenderness.  On examination no cystocele or uterine prolapse noted, very small rectocele  Rectovaginal - deferred.  Psychiactric - appropriate mood and affect  Neurological - alert and oriented X 3    prep time day of service 3 min  visit time with the patient 29 min  post visit work including documentation time 5 min  Total time: 37 min      ASSESSMENT/PLAN:  (N94.9) Discomfort of vagina  (primary encounter diagnosis)  Comment: Possible mild prolapse  Plan: [facility]        Refer to PT for the pelvic floor and discussed this should be enough for reassurance.  " Given a [facility] pelvic floor brochure and reassured that intercourse will not change the vagina.  Discussed normal physical changes that occur with vaginal birth.  She is interested in primary  section IF they have another pregnancy.    (N92.6) Irregular menses  Comment: Bleeding on OCP  Plan: norethindrone-ethinyl estradiol (ORTHO-NOVUM)         1-35 MG-MCG tablet        We will change her pill to same estrogen and progesterone but different amounts to see if can prevent breakthrough bleeding.  If this does not work, would consider Ortho 777.  She will let me know.    Carol Durant MD

## 2021-10-10 ENCOUNTER — HEALTH MAINTENANCE LETTER (OUTPATIENT)
Age: 31
End: 2021-10-10

## 2021-11-01 ENCOUNTER — E-VISIT (OUTPATIENT)
Dept: URGENT CARE | Facility: CLINIC | Age: 31
End: 2021-11-01
Payer: COMMERCIAL

## 2021-11-01 DIAGNOSIS — H10.33 ACUTE BACTERIAL CONJUNCTIVITIS OF BOTH EYES: Primary | ICD-10-CM

## 2021-11-01 PROCEDURE — 99421 OL DIG E/M SVC 5-10 MIN: CPT | Performed by: NURSE PRACTITIONER

## 2021-11-01 RX ORDER — POLYMYXIN B SULFATE AND TRIMETHOPRIM 1; 10000 MG/ML; [USP'U]/ML
1-2 SOLUTION OPHTHALMIC EVERY 4 HOURS
Qty: 10 ML | Refills: 0 | Status: SHIPPED | OUTPATIENT
Start: 2021-11-01 | End: 2021-11-08

## 2021-11-01 NOTE — PATIENT INSTRUCTIONS
Thank you for choosing us for your care. I have placed an order for a prescription so that you can start treatment. View your full visit summary for details by clicking on the link below. Your pharmacist will able to address any questions you may have about the medication.     If you re not feeling better within 2-3 days, please schedule an appointment.  You can schedule an appointment right here in [facility], or call [phone]  If the visit is for the same symptoms as your eVisit, we ll refund the cost of your eVisit if seen within seven days.      Bacterial Conjunctivitis    You have an infection in the membranes covering the white part of the eye. This part of the eye is called the conjunctiva. The infection is called conjunctivitis. The most common symptoms of conjunctivitis include a thick, pus-like discharge from the eye, swollen eyelids, redness, eyelids sticking together upon awakening, and a gritty or scratchy feeling in the eye. Your infection was caused by bacteria. It may be treated with medicine. With treatment, the infection takes about 7 to 10 days to resolve.   Home care    Use prescribed antibiotic eye drops or ointment as directed to treat the infection.    Apply a warm compress (towel soaked in warm water) to the affected eye 3 to 4 times a day. Do this just before applying medicine to the eye.    Use a warm, wet cloth to wipe away crusting of the eyelids in the morning. This is caused by mucus drainage during the night. You may also use saline irrigating solution or artificial tears to rinse away mucus in the eye. Do not put a patch over the eye.    Wash your hands before and after touching the infected eye. This is to prevent spreading the infection to the other eye, and to other people. Don't share your towels or washcloths with others.    You may use acetaminophen or ibuprofen to control pain, unless another medicine was prescribed. Talk with your healthcare provider before using these  medicines if you have chronic liver or kidney disease. Also talk with your provider if you have ever had a stomach ulcer or digestive bleeding.    Don't wear contact lenses until your eyes have healed and all symptoms are gone.    Follow-up care  Follow up with your healthcare provider, or as advised.  When to seek medical advice  Call your healthcare provider right away if any of these occur:    Worsening vision    Increasing pain in the eye    Increasing swelling or redness of the eyelid    Redness spreading around the eye  Testt last reviewed this educational content on 4/1/2020 2000-2021 [employer]. All rights reserved. This information is not intended as a substitute for professional medical care. Always follow your healthcare professional's instructions.

## 2021-12-05 ENCOUNTER — HEALTH MAINTENANCE LETTER (OUTPATIENT)
Age: 31
End: 2021-12-05

## 2022-01-03 NOTE — PROGRESS NOTES
Assessment and Plan    Abdominal pain, generalized  No red flags, some history of constipation and gas, no identifiable pattern to pain  Discussed keep a photo food diary and journal of symptoms  Try STRICT FODMAP diet for a week and see if that helps  Consider referral to GI if symptoms do not improve  Evaluate with   - CBC with platelets  - Comprehensive metabolic panel (BMP + Alb, Alk Phos, ALT, AST, Total. Bili, TP)     Return if symptoms worsen or fail to improve.    Stefanie Patel MD     -------------------------------------------    Chief concern:     About 5 or 6 month ago Nehal started getting a weird, episodic  shooting dull ache - on left side on flank. She started changing diet and working gout - didn't get better or worse.  She could not  figure out trigger. Then two months ago started feeling it on right side. 3 weeks ago feeling ache right in the middle of her belly.     Has pains more days than not. No movement makes the pains worse    She had similar pains when she was pregnant and constipated    She is feeling gassy - thinks it is different from the other pains. She has tried to be low FODMAP diet, but was not very strict about this    She notes that her  whole life she has been super constipated. When her constipation got worse, she  started taking Miralax after she had her son (used to go 8 days without having a bowel movement)  - got better with Miralax and dietary changes - less gluten and sugar.  Two months ago getting worse again:  Large, soft and infrequent.  Taking Miralax more often.  She is among other high fiber foods eating oatmeal and yogurt with hemp    Constipation worse over the last couple of months.  Smooth move tea gave her diarrhea.     ROS  Not smoking  No anxious anymore  No blood in urein  Brother had kidney stone          Current Outpatient Medications   Medication     acetaminophen (TYLENOL) 325 MG tablet     fluticasone (FLONASE) 50 MCG/ACT nasal spray     loratadine  (CLARITIN) 10 MG tablet     norethindrone-ethinyl estradiol (MICROGESTIN 1.5/30) 1.5-30 MG-MCG tablet     No current facility-administered medications for this visit.       The history section was last reviewed by Stefanie Tang on Jan 4, 2022.    History   Smoking Status     Never Smoker   Smokeless Tobacco     Never Used       Social History    Substance and Sexual Activity      Alcohol use: Not Currently        /72 (BP Location: Left arm, Patient Position: Sitting, Cuff Size: Adult Regular)   Pulse 66   Wt 87.8 kg (193 lb 8 oz)   SpO2 99%   BMI 34.28 kg/m    Body mass index is 34.28 kg/m .     EXAM:    General appearance - alert, well appearing, and in no distress  Mental status - normal mood, behavior, speech, dress, motor activity, and thought processes  Chest - clear to auscultation, no wheezes, rales or rhonchi, symmetric air entry  Heart - normal rate, regular rhythm, normal S1, S2, no murmurs, rubs, clicks or gallops  Abdomen - soft, nontender, nondistended, no masses or organomegaly

## 2022-01-04 ENCOUNTER — OFFICE VISIT (OUTPATIENT)
Dept: FAMILY MEDICINE | Facility: CLINIC | Age: 32
End: 2022-01-04
Payer: COMMERCIAL

## 2022-01-04 VITALS
WEIGHT: 193.5 LBS | SYSTOLIC BLOOD PRESSURE: 124 MMHG | OXYGEN SATURATION: 99 % | BODY MASS INDEX: 34.28 KG/M2 | DIASTOLIC BLOOD PRESSURE: 72 MMHG | HEART RATE: 66 BPM

## 2022-01-04 DIAGNOSIS — R10.84 ABDOMINAL PAIN, GENERALIZED: Primary | ICD-10-CM

## 2022-01-04 LAB
ALBUMIN SERPL-MCNC: 3.6 G/DL (ref 3.5–5)
ALP SERPL-CCNC: 54 U/L (ref 45–120)
ALT SERPL W P-5'-P-CCNC: 18 U/L (ref 0–45)
ANION GAP SERPL CALCULATED.3IONS-SCNC: 9 MMOL/L (ref 5–18)
AST SERPL W P-5'-P-CCNC: 16 U/L (ref 0–40)
BILIRUB SERPL-MCNC: 0.3 MG/DL (ref 0–1)
BUN SERPL-MCNC: 11 MG/DL (ref 8–22)
CALCIUM SERPL-MCNC: 8.9 MG/DL (ref 8.5–10.5)
CHLORIDE BLD-SCNC: 106 MMOL/L (ref 98–107)
CO2 SERPL-SCNC: 24 MMOL/L (ref 22–31)
CREAT SERPL-MCNC: 0.68 MG/DL (ref 0.6–1.1)
ERYTHROCYTE [DISTWIDTH] IN BLOOD BY AUTOMATED COUNT: 11.7 % (ref 10–15)
GFR SERPL CREATININE-BSD FRML MDRD: >90 ML/MIN/1.73M2
GLUCOSE BLD-MCNC: 84 MG/DL (ref 70–125)
HCT VFR BLD AUTO: 41.7 % (ref 35–47)
HGB BLD-MCNC: 14 G/DL (ref 11.7–15.7)
MCH RBC QN AUTO: 31.1 PG (ref 26.5–33)
MCHC RBC AUTO-ENTMCNC: 33.6 G/DL (ref 31.5–36.5)
MCV RBC AUTO: 93 FL (ref 78–100)
PLATELET # BLD AUTO: 355 10E3/UL (ref 150–450)
POTASSIUM BLD-SCNC: 4.5 MMOL/L (ref 3.5–5)
PROT SERPL-MCNC: 7.1 G/DL (ref 6–8)
RBC # BLD AUTO: 4.5 10E6/UL (ref 3.8–5.2)
SODIUM SERPL-SCNC: 139 MMOL/L (ref 136–145)
WBC # BLD AUTO: 9.3 10E3/UL (ref 4–11)

## 2022-01-04 PROCEDURE — 36415 COLL VENOUS BLD VENIPUNCTURE: CPT | Performed by: FAMILY MEDICINE

## 2022-01-04 PROCEDURE — 99213 OFFICE O/P EST LOW 20 MIN: CPT | Performed by: FAMILY MEDICINE

## 2022-01-04 PROCEDURE — 85027 COMPLETE CBC AUTOMATED: CPT | Performed by: FAMILY MEDICINE

## 2022-01-04 PROCEDURE — 80053 COMPREHEN METABOLIC PANEL: CPT | Performed by: FAMILY MEDICINE

## 2022-01-07 ENCOUNTER — MYC MEDICAL ADVICE (OUTPATIENT)
Dept: OBGYN | Facility: CLINIC | Age: 32
End: 2022-01-07
Payer: COMMERCIAL

## 2022-01-07 DIAGNOSIS — Z30.011 ORAL CONTRACEPTIVE PRESCRIBED: Primary | ICD-10-CM

## 2022-01-07 NOTE — TELEPHONE ENCOUNTER
"MyChart message from Nehal describing periods not following typical pattern. Per note on 9/27/21 plan was to change her pill to same estrogen and progesterone but different amounts to see if can prevent breakthrough bleeding.  If this does not work, would consider Ortho 777. Patient reports having picked up her \"old\" OCP prescription from the pharmacy and has been taking it since. [facility] cued up for review. Sara Sommer RN  "

## 2022-01-24 ENCOUNTER — MEDICAL CORRESPONDENCE (OUTPATIENT)
Dept: HEALTH INFORMATION MANAGEMENT | Facility: CLINIC | Age: 32
End: 2022-01-24
Payer: COMMERCIAL

## 2022-02-14 ENCOUNTER — TRANSFERRED RECORDS (OUTPATIENT)
Dept: HEALTH INFORMATION MANAGEMENT | Facility: CLINIC | Age: 32
End: 2022-02-14
Payer: COMMERCIAL

## 2022-02-14 LAB — TSH SERPL-ACNC: 0.75 UIU/ML (ref 0.45–4.5)

## 2022-02-16 ENCOUNTER — TRANSFERRED RECORDS (OUTPATIENT)
Dept: HEALTH INFORMATION MANAGEMENT | Facility: CLINIC | Age: 32
End: 2022-02-16
Payer: COMMERCIAL

## 2022-03-22 ASSESSMENT — ANXIETY QUESTIONNAIRES
7. FEELING AFRAID AS IF SOMETHING AWFUL MIGHT HAPPEN: SEVERAL DAYS
5. BEING SO RESTLESS THAT IT IS HARD TO SIT STILL: NOT AT ALL
3. WORRYING TOO MUCH ABOUT DIFFERENT THINGS: SEVERAL DAYS
1. FEELING NERVOUS, ANXIOUS, OR ON EDGE: SEVERAL DAYS
4. TROUBLE RELAXING: SEVERAL DAYS
7. FEELING AFRAID AS IF SOMETHING AWFUL MIGHT HAPPEN: SEVERAL DAYS
GAD7 TOTAL SCORE: 6
6. BECOMING EASILY ANNOYED OR IRRITABLE: SEVERAL DAYS
GAD7 TOTAL SCORE: 6
GAD7 TOTAL SCORE: 6
2. NOT BEING ABLE TO STOP OR CONTROL WORRYING: SEVERAL DAYS

## 2022-03-22 ASSESSMENT — PATIENT HEALTH QUESTIONNAIRE - PHQ9
SUM OF ALL RESPONSES TO PHQ QUESTIONS 1-9: 9
SUM OF ALL RESPONSES TO PHQ QUESTIONS 1-9: 9
10. IF YOU CHECKED OFF ANY PROBLEMS, HOW DIFFICULT HAVE THESE PROBLEMS MADE IT FOR YOU TO DO YOUR WORK, TAKE CARE OF THINGS AT HOME, OR GET ALONG WITH OTHER PEOPLE: NOT DIFFICULT AT ALL

## 2022-03-23 ASSESSMENT — ANXIETY QUESTIONNAIRES: GAD7 TOTAL SCORE: 6

## 2022-03-23 ASSESSMENT — PATIENT HEALTH QUESTIONNAIRE - PHQ9: SUM OF ALL RESPONSES TO PHQ QUESTIONS 1-9: 9

## 2022-03-24 ENCOUNTER — VIRTUAL VISIT (OUTPATIENT)
Dept: FAMILY MEDICINE | Facility: CLINIC | Age: 32
End: 2022-03-24
Payer: COMMERCIAL

## 2022-03-24 DIAGNOSIS — F41.8 DEPRESSION WITH ANXIETY: Primary | ICD-10-CM

## 2022-03-24 PROCEDURE — 99213 OFFICE O/P EST LOW 20 MIN: CPT | Mod: 95 | Performed by: FAMILY MEDICINE

## 2022-03-24 RX ORDER — ESCITALOPRAM OXALATE 10 MG/1
10 TABLET ORAL DAILY
Qty: 90 TABLET | Refills: 3 | Status: SHIPPED | OUTPATIENT
Start: 2022-03-24 | End: 2023-03-17

## 2022-03-24 NOTE — PROGRESS NOTES
Nehal is a 31 year old who is being evaluated via a billable video visit.      How would you like to obtain your AVS? MyChart  If the video visit is dropped, the invitation should be resent by: Text to cell phone: [phone]  Will anyone else be joining your video visit? No    Video Start Time: 12:59 PM    Assessment & Plan     Depression with anxiety  - escitalopram (LEXAPRO) 10 MG tablet  Dispense: 90 tablet; Refill: 3    Discussed years prescription given , but if she want to taper   - just le me know   - do not discontinue in the winter - too hard    Return in about 1 year (around 3/24/2023) for Routine preventive, or earlier as needed.    Stefanie Patel MD  [facility]    History of Present Illness       Mental Health Follow-up:  Patient presents to follow-up on Depression & Anxiety.Patient's depression since last visit has been:  Bad  The patient is not having other symptoms associated with depression.  Patient's anxiety since last visit has been:  Medium  The patient is not having other symptoms associated with anxiety.  Any significant life events: other  Patient is feeling anxious or having panic attacks.  Patient has no concerns about alcohol or drug use.       Today's PHQ-9         PHQ-9 Total Score: 9  PHQ-9 Q9 Thoughts of better off dead/self-harm past 2 weeks :   (P) Not at all    How difficult have these problems made it for you to do your work, take care of things at home, or get along with other people: Not difficult at all    Today's J LUIS-7 Score: 6    She eats 4 or more servings of fruits and vegetables daily.She consumes 0 sweetened beverage(s) daily.She exercises with enough effort to increase her heart rate 20 to 29 minutes per day.  She exercises with enough effort to increase her heart rate 3 or less days per week.   She is taking medications regularly.    Subjective   Nehal is a 31 year old who presents for the following health issues    Anxiety and  depression  Antonio had been on escitalopram in the past, but was able to discontinue this. However now she notes that things have been a little worse: [facility] in general, parenthood,dog passed away,  who works a lot and is less available. Latter is ongoing , and they are working on fixing that    Nehal is not sleeping so well: her son wakes her up - although in the last week he has been better and Antonio tends to toss and turn    J LUIS-7 SCORE 5/12/2020 3/22/2022   Total Score - 6 (mild anxiety)   Total Score 0 6     PHQ 7/7/2020 9/29/2020 3/22/2022   PHQ-9 Total Score 2 1 9   Q9: Thoughts of better off dead/self-harm past 2 weeks Not at all Not at all Not at all       Self care   - she did    - next week she will work form home 2x/week   - can go on walks with her son   - son is in     Was going to get third Covid in February but needed to put this off    Review of Systems   Feeling physically well      Objective           Vitals:  No vitals were obtained today due to virtual visit.    Physical Exam     General appearance - alert, well appearing, and in no distress  Mental status - normal mood, behavior, speech,dress, motor activity, and thought processes  Chest - breathing well and without labor  Neurological - alert, oriented, normal speech, no focal findings or movement disorder noted    Video-Visit Details    Type of service:  Video Visit    Video End Time:1:10 PM    Originating Location (pt. Location): Home    Distant Location (provider location):  [facility]     Platform used for Video Visit: E-Diversify Yourself  Answers for HPI/ROS submitted by the patient on 3/22/2022  If you checked off any problems, how difficult have these problems made it for you to do your work, take care of things at home, or get along with other people?: Not difficult at all  PHQ9 TOTAL SCORE: 9  J LUIS 7 TOTAL SCORE: 6  Depression/Anxiety: Depression & Anxiety  Status since last visit:: bad  Anxiety since  last: : medium  Other associated symptoms of depression:: No  Other associated symotome: : No  Significant life event: : other  Anxious:: Yes  Current substance use:: No  How many servings of fruits and vegetables do you eat daily?: 4 or more  On average, how many sweetened beverages do you drink each day (Examples: soda, juice, sweet tea, etc.  Do NOT count diet or artificially sweetened beverages)?: 0  How many minutes a day do you exercise enough to make your heart beat faster?: 20 to 29  How many days a week do you exercise enough to make your heart beat faster?: 3 or less  How many days per week do you miss taking your medication?: 0

## 2022-08-23 ENCOUNTER — TRANSFERRED RECORDS (OUTPATIENT)
Dept: HEALTH INFORMATION MANAGEMENT | Facility: CLINIC | Age: 32
End: 2022-08-23

## 2022-08-25 ENCOUNTER — TRANSFERRED RECORDS (OUTPATIENT)
Dept: HEALTH INFORMATION MANAGEMENT | Facility: CLINIC | Age: 32
End: 2022-08-25

## 2022-08-25 LAB
ALT SERPL-CCNC: 24 IU/L (ref 0–32)
AST SERPL-CCNC: 15 IU/L (ref 0–40)

## 2022-08-29 ENCOUNTER — ANCILLARY PROCEDURE (OUTPATIENT)
Dept: ULTRASOUND IMAGING | Facility: CLINIC | Age: 32
End: 2022-08-29
Attending: NURSE PRACTITIONER
Payer: COMMERCIAL

## 2022-08-29 DIAGNOSIS — R10.12 LUQ PAIN: ICD-10-CM

## 2022-08-29 PROCEDURE — 76700 US EXAM ABDOM COMPLETE: CPT | Mod: GC | Performed by: STUDENT IN AN ORGANIZED HEALTH CARE EDUCATION/TRAINING PROGRAM

## 2022-09-06 ENCOUNTER — OFFICE VISIT (OUTPATIENT)
Dept: FAMILY MEDICINE | Facility: CLINIC | Age: 32
End: 2022-09-06
Payer: COMMERCIAL

## 2022-09-06 VITALS
BODY MASS INDEX: 32.49 KG/M2 | OXYGEN SATURATION: 97 % | SYSTOLIC BLOOD PRESSURE: 121 MMHG | WEIGHT: 190.3 LBS | TEMPERATURE: 98.6 F | DIASTOLIC BLOOD PRESSURE: 69 MMHG | HEART RATE: 63 BPM | HEIGHT: 64 IN

## 2022-09-06 DIAGNOSIS — Z00.00 ROUTINE GENERAL MEDICAL EXAMINATION AT A HEALTH CARE FACILITY: Primary | ICD-10-CM

## 2022-09-06 DIAGNOSIS — F41.8 DEPRESSION WITH ANXIETY: ICD-10-CM

## 2022-09-06 PROCEDURE — 99395 PREV VISIT EST AGE 18-39: CPT | Performed by: FAMILY MEDICINE

## 2022-09-06 ASSESSMENT — ENCOUNTER SYMPTOMS
NERVOUS/ANXIOUS: 0
CHILLS: 0
EYE PAIN: 0
HEMATOCHEZIA: 0
NAUSEA: 0
HEADACHES: 0
ABDOMINAL PAIN: 0
COUGH: 0
ARTHRALGIAS: 0
HEARTBURN: 0
WEAKNESS: 0
JOINT SWELLING: 0
CONSTIPATION: 0
BREAST MASS: 0
DIZZINESS: 0
PARESTHESIAS: 0
DYSURIA: 0
PALPITATIONS: 0
SHORTNESS OF BREATH: 0
FEVER: 0
SORE THROAT: 0
FREQUENCY: 0
DIARRHEA: 0
HEMATURIA: 0
MYALGIAS: 0

## 2022-09-06 NOTE — PROGRESS NOTES
"      ASSESSMENT/PLAN:   Nehal was seen today for physical and recheck medication.    Diagnoses and all orders for this visit:    Routine general medical examination at a health care facility    Depression with anxiety  Well-controlled on Lexapro.  I can refill this medication in the next year as needed    Other orders  -     REVIEW OF HEALTH MAINTENANCE PROTOCOL ORDERS          COUNSELING:  Reviewed preventive health counseling, as reflected in patient instructions    Estimated body mass index is 32.66 kg/m  as calculated from the following:    Height as of this encounter: 1.626 m (5' 4\").    Weight as of this encounter: 86.3 kg (190 lb 4.8 oz).        She reports that she has never smoked. She has never used smokeless tobacco.      Stefanie Patel MD  [facility]     SUBJECTIVE:   CC: Nehal Wadsworth is an 32 year old woman who presents for preventive health visit.       Patient has been advised of split billing requirements and indicates understanding: Yes  Healthy Habits:     Getting at least 3 servings of Calcium per day:  Yes    Bi-annual eye exam:  Yes    Dental care twice a year:  NO    Sleep apnea or symptoms of sleep apnea:  None    Diet:  Gluten-free/reduced    Frequency of exercise:  4-5 days/week    Duration of exercise:  30-45 minutes    Taking medications regularly:  Yes    Medication side effects:  Not applicable    PHQ-2 Total Score: 0    Additional concerns today:  No    Re abdominal pain discussed previously and referral to [facility] : pain got worse -so bad she almost went to the emergency room..  She saw [facility] again last week-they ordered an abdominal ultrasound which was normal.  She has an upper endoscopy scheduled for September 8.  We also started her on omeprazole: She noticed an immediate difference and wonders whether her pain is related to an ulcer or other stomach issue    Depression - feels good on Lexapro  PHQ 7/7/2020 9/29/2020 3/22/2022   PHQ-9 Total Score " 2 1 9   Q9: Thoughts of better off dead/self-harm past 2 weeks Not at all Not at all Not at all        Today's PHQ-2 Score:   PHQ-2 ( 1999 [employer]) 9/6/2022   Q1: Little interest or pleasure in doing things 0   Q2: Feeling down, depressed or hopeless 0   PHQ-2 Score 0   PHQ-2 Total Score (12-17 Years)- Positive if 3 or more points; Administer PHQ-A if positive -   Q1: Little interest or pleasure in doing things Not at all   Q2: Feeling down, depressed or hopeless Not at all   PHQ-2 Score 0       Abuse: Current or Past (Physical, Sexual or Emotional) - No  Do you feel safe in your environment? Yes        Social History     Tobacco Use     Smoking status: Never Smoker     Smokeless tobacco: Never Used   Substance Use Topics     Alcohol use: Not Currently         Alcohol Use 9/6/2022   Prescreen: >3 drinks/day or >7 drinks/week? No       Reviewed orders with patient.  Reviewed health maintenance and updated orders accordingly - Yes         Breast Cancer Screening:    Breast CA Risk Assessment (FHS-7) 9/6/2022   Do you have a family history of breast, colon, or ovarian cancer? No / Unknown   mother is adopted        History of abnormal Pap smear: NO - age 30-65 PAP every 5 years with negative HPV co-testing recommended  PAP / HPV Latest Ref Rng & Units 10/13/2020 9/22/2017   PAP - - Negative for squamous intraepithelial lesion or malignancy  Electronically signed by Sury Pollock CT (ASCP) on 10/2/2017 at  1:28 PM     PAP (Historical) - NIL -   HPV16 NEG:Negative Negative -   HPV18 NEG:Negative Negative -   HRHPV NEG:Negative Negative -     Reviewed and updated as needed this visit by clinical staff   Tobacco  Allergies  Meds                Reviewed and updated as needed this visit by Provider                       Review of Systems   Constitutional: Negative for chills and fever.   HENT: Negative for congestion, ear pain, hearing loss and sore throat.    Eyes: Negative for pain and visual disturbance.  "  Respiratory: Negative for cough and shortness of breath.    Cardiovascular: Negative for chest pain, palpitations and peripheral edema.   Gastrointestinal: Negative for abdominal pain, constipation, diarrhea, heartburn, hematochezia and nausea.   Breasts:  Positive for tenderness. Negative for breast mass and discharge.   Genitourinary: Negative for dysuria, frequency, genital sores, hematuria, pelvic pain, urgency, vaginal bleeding and vaginal discharge.   Musculoskeletal: Negative for arthralgias, joint swelling and myalgias.   Skin: Negative for rash.   Neurological: Negative for dizziness, weakness, headaches and paresthesias.   Psychiatric/Behavioral: Negative for mood changes. The patient is not nervous/anxious.      Reproductive Health She is ovulating - this is why she is having breast tenderness.  Nehal stopped taking oral contraceptives in July.  She and her  are infrequently active and use condoms if needed.  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 [employer]        OBJECTIVE:   /69 (BP Location: Left arm, Patient Position: Sitting, Cuff Size: Adult Regular)   Pulse 63   Temp 98.6  F (37  C) (Oral)   Ht 1.626 m (5' 4\")   Wt 86.3 kg (190 lb 4.8 oz)   SpO2 97%   BMI 32.66 kg/m    Physical Exam  General appearance - alert, well appearing, and in no distress  Mental status - normal mood, behavior, speech, dress, motor activity, and thought processes  Eyes - pupils equal and reactive, extraocular eye movements intact, funduscopic exam normal, discs flat and sharp  Ears - bilateral TM's and external ear canals normal  Mouth - mucous membranes moist, pharynx normal without lesions  Neck - supple, no significant adenopathy, carotids upstroke normal bilaterally, no " bruits, thyroid exam: thyroid is normal in size without nodules or tenderness  Chest - clear to auscultation, no wheezes, rales or rhonchi, symmetric air entry  Heart - normal rate, regular rhythm, normal S1, S2, no murmurs, rubs, clicks or gallops  Abdomen - soft, nontender, nondistended, no masses or organomegaly  Breasts - breasts appear normal, no suspicious masses, no skin or nipple changes or axillary nodes  Neurological - alert, oriented, normal speech, no focal findings or movement disorder noted, DTR's normal and symmetric  Extremities - peripheral pulses normal, no pedal edema, no clubbing or cyanosis  Skin - no rashes or worrisome lesions

## 2022-09-08 ENCOUNTER — TRANSFERRED RECORDS (OUTPATIENT)
Dept: HEALTH INFORMATION MANAGEMENT | Facility: CLINIC | Age: 32
End: 2022-09-08

## 2022-09-18 ENCOUNTER — HEALTH MAINTENANCE LETTER (OUTPATIENT)
Age: 32
End: 2022-09-18

## 2022-09-20 ENCOUNTER — TRANSFERRED RECORDS (OUTPATIENT)
Dept: HEALTH INFORMATION MANAGEMENT | Facility: CLINIC | Age: 32
End: 2022-09-20

## 2022-10-14 ENCOUNTER — TRANSFERRED RECORDS (OUTPATIENT)
Dept: HEALTH INFORMATION MANAGEMENT | Facility: CLINIC | Age: 32
End: 2022-10-14

## 2022-10-27 ENCOUNTER — TRANSFERRED RECORDS (OUTPATIENT)
Dept: HEALTH INFORMATION MANAGEMENT | Facility: CLINIC | Age: 32
End: 2022-10-27

## 2022-10-28 ENCOUNTER — TRANSFERRED RECORDS (OUTPATIENT)
Dept: HEALTH INFORMATION MANAGEMENT | Facility: CLINIC | Age: 32
End: 2022-10-28

## 2022-12-05 ENCOUNTER — TRANSFERRED RECORDS (OUTPATIENT)
Dept: HEALTH INFORMATION MANAGEMENT | Facility: CLINIC | Age: 32
End: 2022-12-05

## 2022-12-09 ENCOUNTER — OFFICE VISIT (OUTPATIENT)
Dept: OBGYN | Facility: CLINIC | Age: 32
End: 2022-12-09
Payer: COMMERCIAL

## 2022-12-09 VITALS
OXYGEN SATURATION: 97 % | HEART RATE: 83 BPM | WEIGHT: 193 LBS | HEIGHT: 63 IN | BODY MASS INDEX: 34.2 KG/M2 | DIASTOLIC BLOOD PRESSURE: 80 MMHG | SYSTOLIC BLOOD PRESSURE: 130 MMHG

## 2022-12-09 DIAGNOSIS — R10.9 ABDOMINAL CRAMPING: Primary | ICD-10-CM

## 2022-12-09 PROCEDURE — 99213 OFFICE O/P EST LOW 20 MIN: CPT | Performed by: OBSTETRICS & GYNECOLOGY

## 2022-12-09 NOTE — PROGRESS NOTES
"CC:  Abdominal pain  HPI:  Nehal Wadsworth is a 32 year old female Patient's last menstrual period was 11/21/2022 (exact date). came off OCP this summer as the pill seem to be making her constipated.  In August was having a lot of abdominal pain and cramping.  Found to have a peanut and wheat allergy and she was eating peanut butter every morning.  Since adjusting her diet, abdominal pains have improved and are pretty much gone.  Here today just to make sure she is fine.    Still notes throughout the month some random cramping in left side can have a pokey feeling like in her ovary.  They have decided to have no more children and her spouse is going to undergo a vasectomy.  Denies dyspareunia.    Also had urinary incontinence for approximately 4 weeks in the month of August that has since resolved.    Allergies: Hard wheat, Measles mumps and rubella virus vaccine live, and Peanut-derived    EXAM:  Blood pressure 130/80, pulse 83, height 1.6 m (5' 3\"), weight 87.5 kg (193 lb), last menstrual period 11/21/2022, SpO2 97 %, currently breastfeeding.  General - pleasant female in no acute distress.  Abdomen - soft, nontender, nondistended, no hepatosplenomegaly.  Pelvic - EG: normal adult female, BUS: within normal limits, Vagina: well rugated, no discharge, Cervix: no lesions or CMT, Uterus: firm, normal sized and nontender, Adnexae: no masses or tenderness.  Rectovaginal - deferred.  Psychiactric - appropriate mood and affect  Neurological - alert and oriented X 3      ASSESSMENT/PLAN:  (R10.9) Abdominal cramping  (primary encounter diagnosis)  Comment: Occasional  Plan: Discussed normal ovulation and that she could be feeling that especially midcycle.  If she notes increased pain, would plan for pelvic ultrasound at that time.  Discussed incontinence could have been due to diet or medication and since has resolved, no further follow-up needed.  If incontinence recurs, would plan physical therapy referral.      Carol" MD Bhargav

## 2023-03-17 DIAGNOSIS — F41.8 DEPRESSION WITH ANXIETY: ICD-10-CM

## 2023-03-17 RX ORDER — ESCITALOPRAM OXALATE 10 MG/1
10 TABLET ORAL DAILY
Qty: 90 TABLET | Refills: 1 | Status: SHIPPED | OUTPATIENT
Start: 2023-03-17 | End: 2023-09-18

## 2023-03-17 NOTE — TELEPHONE ENCOUNTER
From my visit 9/6/222:     Depression with anxiety  Well-controlled on Lexapro.  I can refill this medication in the next year as needed    PHQ 7/7/2020 9/29/2020 3/22/2022   PHQ-9 Total Score 2 1 9   Q9: Thoughts of better off dead/self-harm past 2 weeks Not at all Not at all Not at all

## 2023-03-17 NOTE — TELEPHONE ENCOUNTER
"Routing refill request to provider for review/approval because:  Patient needs to be seen because it has been more than 6 months since last office visit.  PHQ-9 outside protocol parameters    Last Written Prescription Date:  3/24/2022  Last Fill Quantity: 90,  # refills: 3   Last office visit provider:  9/6/2022     Requested Prescriptions   Pending Prescriptions Disp Refills     escitalopram (LEXAPRO) 10 MG tablet [Pharmacy Med Name: ESCITALOPRAM 10 MG TABLET] 90 tablet 3     Sig: TAKE 1 TABLET (10 MG) BY MOUTH DAILY.       SSRIs Protocol Failed - 3/17/2023  2:49 AM        Failed - PHQ-9 score less than 5 in past 6 months     Please review last PHQ-9 score.           Failed - Recent (6 mo) or future (30 days) visit within the authorizing provider's specialty     Patient had office visit in the last 6 months or has a visit in the next 30 days with authorizing provider or within the authorizing provider's specialty.  See \"Patient Info\" tab in inbasket, or \"Choose Columns\" in Meds & Orders section of the refill encounter.            Passed - Medication is active on med list        Passed - Patient is age 18 or older        Passed - No active pregnancy on record        Passed - No positive pregnancy test in last 12 months             Aby Griffith RN 03/17/23 2:25 PM      "

## 2023-06-09 ENCOUNTER — OFFICE VISIT (OUTPATIENT)
Dept: FAMILY MEDICINE | Facility: CLINIC | Age: 33
End: 2023-06-09
Payer: COMMERCIAL

## 2023-06-09 VITALS
HEART RATE: 75 BPM | OXYGEN SATURATION: 98 % | SYSTOLIC BLOOD PRESSURE: 110 MMHG | BODY MASS INDEX: 34.21 KG/M2 | TEMPERATURE: 98.6 F | RESPIRATION RATE: 16 BRPM | HEIGHT: 63 IN | WEIGHT: 193.1 LBS | DIASTOLIC BLOOD PRESSURE: 64 MMHG

## 2023-06-09 DIAGNOSIS — Z12.4 CERVICAL CANCER SCREENING: Primary | ICD-10-CM

## 2023-06-09 PROCEDURE — 87624 HPV HI-RISK TYP POOLED RSLT: CPT | Performed by: FAMILY MEDICINE

## 2023-06-09 PROCEDURE — 99213 OFFICE O/P EST LOW 20 MIN: CPT | Performed by: FAMILY MEDICINE

## 2023-06-09 PROCEDURE — G0145 SCR C/V CYTO,THINLAYER,RESCR: HCPCS | Performed by: FAMILY MEDICINE

## 2023-06-09 NOTE — PROGRESS NOTES
Assessment & Plan     Cervical cancer screening  - Pap Screen with HPV - recommended age 30 - 65 years    Watch tiny bump on mons pubis for now -  return to the clinic if it gets bigger    Return in about 1 year (around 6/9/2024) for Routine preventive, or earlier as needed.    Stefanie Patel MD  [facility]    Mehdi Calvert is a 32 year old, presenting for the following health issues:  Vaginal Problem (Lump labia for the last month.)        6/9/2023     9:15 AM   Additional Questions   Roomed by Emely CHA CMA     Vaginal Problem     History of Present Illness       Reason for visit:  Bump on labia  Symptom onset:  1-2 weeks ago  Symptoms include:  Labia major  Symptom intensity:  Mild  Symptom progression:  Staying the same  Had these symptoms before:  Yes  Has tried/received treatment for these symptoms:  Yes  Previous treatment was successful:  No  What makes it worse:  No  What makes it better:  N/a    She eats 4 or more servings of fruits and vegetables daily.She consumes 0 sweetened beverage(s) daily.She exercises with enough effort to increase her heart rate 10 to 19 minutes per day.  She exercises with enough effort to increase her heart rate 4 days per week. She is missing 1 dose(s) of medications per week.  She is not taking prescribed medications regularly due to remembering to take.   Vaginal Symptoms  Onset/Duration: 2-3 weeks ago   Description:  Vaginal Discharge: none   Itching (Pruritis): No  Burning sensation:  No  Odor: No  Accompanying Signs & Symptoms:  Urinary symptoms: No  Abdominal pain: No  Fever: No  History:   Sexually active: YES  New Partner: No  Possibility of Pregnancy:  YES  Recent antibiotic use: No  Previous vaginitis issues: No  Precipitating or alleviating factors: None  Therapies tried and outcome: none    She and  didn't have sex for 10 years.  No they have worked through some things and are intimate again.  has gotten STD testing -  he is clear    Nehal noticed a bump on  Left labia majora about-3 weeks ago.  It doesn't bother her - no painful or itchy - and has not changed in size.        She had something similar 4-5 years ago - it got bigger and eventually send away    Preventive  - Nehal says she will hold off on Covid-vaccine        Objective    LMP 05/24/2023 (Exact Date)   Breastfeeding No   There is no height or weight on file to calculate BMI.  Physical Exam   General appearance - alert, well appearing, and in no distress  Mental status - normal mood, behavior, speech, dress, motor activity, and thought processes  Pelvic - VULVA: normal appearing vulva with no masses, tenderness or lesions, VAGINA: normal appearing vagina with normal color and discharge, no lesions, CERVIX: normal appearing cervix without discharge or lesions, PAP: Pap smear done today.   Tiny bump on left mons- might be  folliculitis, but it could possible be tiny wart

## 2023-06-13 LAB
BKR LAB AP GYN ADEQUACY: NORMAL
BKR LAB AP GYN INTERPRETATION: NORMAL
BKR LAB AP HPV REFLEX: NORMAL
BKR LAB AP LMP: NORMAL
BKR LAB AP PREVIOUS ABNORMAL: NORMAL
PATH REPORT.COMMENTS IMP SPEC: NORMAL
PATH REPORT.COMMENTS IMP SPEC: NORMAL
PATH REPORT.RELEVANT HX SPEC: NORMAL

## 2023-06-14 LAB
HUMAN PAPILLOMA VIRUS 16 DNA: NEGATIVE
HUMAN PAPILLOMA VIRUS 18 DNA: NEGATIVE
HUMAN PAPILLOMA VIRUS FINAL DIAGNOSIS: NORMAL
HUMAN PAPILLOMA VIRUS OTHER HR: NEGATIVE

## 2023-06-23 ENCOUNTER — TRANSFERRED RECORDS (OUTPATIENT)
Dept: HEALTH INFORMATION MANAGEMENT | Facility: CLINIC | Age: 33
End: 2023-06-23
Payer: COMMERCIAL

## 2023-07-24 ENCOUNTER — E-VISIT (OUTPATIENT)
Dept: URGENT CARE | Facility: CLINIC | Age: 33
End: 2023-07-24
Payer: COMMERCIAL

## 2023-07-24 DIAGNOSIS — H10.30 ACUTE BACTERIAL CONJUNCTIVITIS, UNSPECIFIED LATERALITY: Primary | ICD-10-CM

## 2023-07-24 PROCEDURE — 99421 OL DIG E/M SVC 5-10 MIN: CPT | Performed by: PHYSICIAN ASSISTANT

## 2023-07-24 RX ORDER — POLYMYXIN B SULFATE AND TRIMETHOPRIM 1; 10000 MG/ML; [USP'U]/ML
SOLUTION OPHTHALMIC
Qty: 10 ML | Refills: 0 | Status: SHIPPED | OUTPATIENT
Start: 2023-07-24 | End: 2023-07-31

## 2023-07-24 NOTE — PATIENT INSTRUCTIONS
Thank you for choosing us for your care. I have placed an order for a prescription so that you can start treatment. View your full visit summary for details by clicking on the link below. Your pharmacist will able to address any questions you may have about the medication.     If you re not feeling better within 2-3 days, please schedule an appointment.  You can schedule an appointment right here in [facility], or call [phone]  If the visit is for the same symptoms as your eVisit, we ll refund the cost of your eVisit if seen within seven days.      Bacterial Conjunctivitis    You have an infection in the membranes covering the white part of the eye. This part of the eye is called the conjunctiva. The infection is called conjunctivitis. The most common symptoms of conjunctivitis include a thick, pus-like discharge from the eye, swollen eyelids, redness, eyelids sticking together upon awakening, and a gritty or scratchy feeling in the eye. Your infection was caused by bacteria. It may be treated with medicine. With treatment, the infection takes about 7 to 10 days to resolve.   Home care    Use prescribed antibiotic eye drops or ointment as directed to treat the infection.    Apply a warm compress (towel soaked in warm water) to the affected eye 3 to 4 times a day. Do this just before applying medicine to the eye.    Use a warm, wet cloth to wipe away crusting of the eyelids in the morning. This is caused by mucus drainage during the night. You may also use saline irrigating solution or artificial tears to rinse away mucus in the eye. Do not put a patch over the eye.    Wash your hands before and after touching the infected eye. This is to prevent spreading the infection to the other eye, and to other people. Don't share your towels or washcloths with others.    You may use acetaminophen or ibuprofen to control pain, unless another medicine was prescribed. Talk with your healthcare provider before using these  medicines if you have chronic liver or kidney disease. Also talk with your provider if you have ever had a stomach ulcer or digestive bleeding.    Don't wear contact lenses until your eyes have healed and all symptoms are gone.    Follow-up care  Follow up with your healthcare provider, or as advised.  When to seek medical advice  Call your healthcare provider right away if any of these occur:    Worsening vision    Increasing pain in the eye    Increasing swelling or redness of the eyelid    Redness spreading around the eye  [employer] last reviewed this educational content on 4/1/2020 2000-2022 [employer]. All rights reserved. This information is not intended as a substitute for professional medical care. Always follow your healthcare professional's instructions.

## 2023-08-07 ENCOUNTER — PATIENT OUTREACH (OUTPATIENT)
Dept: CARE COORDINATION | Facility: CLINIC | Age: 33
End: 2023-08-07
Payer: COMMERCIAL

## 2023-08-21 ENCOUNTER — PATIENT OUTREACH (OUTPATIENT)
Dept: CARE COORDINATION | Facility: CLINIC | Age: 33
End: 2023-08-21
Payer: COMMERCIAL

## 2023-09-17 DIAGNOSIS — F41.8 DEPRESSION WITH ANXIETY: ICD-10-CM

## 2023-09-18 RX ORDER — ESCITALOPRAM OXALATE 10 MG/1
10 TABLET ORAL DAILY
Qty: 90 TABLET | Refills: 1 | Status: SHIPPED | OUTPATIENT
Start: 2023-09-18 | End: 2024-04-04

## 2023-09-28 ENCOUNTER — OFFICE VISIT (OUTPATIENT)
Dept: FAMILY MEDICINE | Facility: CLINIC | Age: 33
End: 2023-09-28
Payer: COMMERCIAL

## 2023-09-28 VITALS
SYSTOLIC BLOOD PRESSURE: 136 MMHG | TEMPERATURE: 98.2 F | WEIGHT: 193 LBS | OXYGEN SATURATION: 97 % | BODY MASS INDEX: 34.2 KG/M2 | HEIGHT: 63 IN | RESPIRATION RATE: 17 BRPM | HEART RATE: 60 BPM | DIASTOLIC BLOOD PRESSURE: 70 MMHG

## 2023-09-28 DIAGNOSIS — N92.6 IRREGULAR MENSES: Primary | ICD-10-CM

## 2023-09-28 LAB
FSH SERPL IRP2-ACNC: 4.3 MIU/ML
LH SERPL-ACNC: 13.9 MIU/ML
TSH SERPL DL<=0.005 MIU/L-ACNC: 1 UIU/ML (ref 0.3–4.2)

## 2023-09-28 PROCEDURE — 36415 COLL VENOUS BLD VENIPUNCTURE: CPT | Performed by: FAMILY MEDICINE

## 2023-09-28 PROCEDURE — 90686 IIV4 VACC NO PRSV 0.5 ML IM: CPT | Performed by: FAMILY MEDICINE

## 2023-09-28 PROCEDURE — 90471 IMMUNIZATION ADMIN: CPT | Performed by: FAMILY MEDICINE

## 2023-09-28 PROCEDURE — 83002 ASSAY OF GONADOTROPIN (LH): CPT | Performed by: FAMILY MEDICINE

## 2023-09-28 PROCEDURE — 99214 OFFICE O/P EST MOD 30 MIN: CPT | Mod: 25 | Performed by: FAMILY MEDICINE

## 2023-09-28 PROCEDURE — 83001 ASSAY OF GONADOTROPIN (FSH): CPT | Performed by: FAMILY MEDICINE

## 2023-09-28 PROCEDURE — 84443 ASSAY THYROID STIM HORMONE: CPT | Performed by: FAMILY MEDICINE

## 2023-09-28 NOTE — PROGRESS NOTES
"  Assessment & Plan     Irregular menses  - menses are getting yolanda and more spaced out  Evaluate with   - TSH with free T4 reflex  - Follicle stimulating hormone  - Luteinizing Hormone  - US Pelvic Complete with Transvaginal    Return for pending test results.    Stefanie Patel MD  [facility]      Subjective   Nehal is a 33 year old, presenting for the following health issues:  Abnormal Bleeding Problem (Longer periods than usual, today is 55th day (23rd day late), has taken multiple home pregnancy tests that were negative. )      9/28/2023     1:03 PM   Additional Questions   Roomed by SANJAY Yi     Time in between menses is  getting gradually longer over the last 6 months. Originally about 28 days, then 32,34, 36; she is now on day 55 and have not had menses.  She has had some premenstrual symptoms like breast tenderness and cramps, but then does not get her menstrual period she also notes she started menstruating when she was age 9 so perhaps she is going to be done early as well.  She wonders if she is perimenopausal.  I did discuss this was a somewhat nebulous term and it is not unusual for cycles to change as we get older even though it is not perimenopause    ROS   - no hyperandrogenism symptoms (acne, hair growth)   - no breast leakage   - had stopped oral contraceptive pills due to intolerance, mood swings   - her son is now 3 years old    Wt Readings from Last 5 Encounters:   09/28/23 87.5 kg (193 lb)   06/09/23 87.6 kg (193 lb 1.6 oz)   12/09/22 87.5 kg (193 lb)   09/06/22 86.3 kg (190 lb 4.8 oz)   01/04/22 87.8 kg (193 lb 8 oz)         Objective    /70 (BP Location: Left arm, Patient Position: Sitting, Cuff Size: Adult Regular)   Pulse 60   Temp 98.2  F (36.8  C) (Tympanic)   Resp 17   Ht 1.6 m (5' 3\")   Wt 87.5 kg (193 lb)   LMP 08/05/2023   SpO2 97%   BMI 34.19 kg/m    Body mass index is 34.19 kg/m .  General appearance - alert, well appearing, and in no " distress  Mental status - normal mood, behavior, speech, dress, motor activity, and thought processes

## 2023-09-28 NOTE — NURSING NOTE
Prior to immunization administration, verified patients identity using patient s name and date of birth. Please see Immunization Activity for additional information.     Screening Questionnaire for Adult Immunization    Are you sick today?   No   Do you have allergies to medications, food, a vaccine component or latex?   Yes     Have you ever had a serious reaction after receiving a vaccination?   Yes   Do you have a long-term health problem with heart, lung, kidney, or metabolic disease (e.g., diabetes), asthma, a blood disorder, no spleen, complement component deficiency, a cochlear implant, or a spinal fluid leak?  Are you on long-term aspirin therapy?   No   Do you have cancer, leukemia, HIV/AIDS, or any other immune system problem?   No   Do you have a parent, brother, or sister with an immune system problem?   No   In the past 3 months, have you taken medications that affect  your immune system, such as prednisone, other steroids, or anticancer drugs; drugs for the treatment of rheumatoid arthritis, Crohn s disease, or psoriasis; or have you had radiation treatments?   No   Have you had a seizure, or a brain or other nervous system problem?   Yes   During the past year, have you received a transfusion of blood or blood    products, or been given immune (gamma) globulin or antiviral drug?   No   For women: Are you pregnant or is there a chance you could become       pregnant during the next month?   No   Have you received any vaccinations in the past 4 weeks?   No     Immunization questionnaire was positive for at least one answer.  Notified yes.      Patient instructed to remain in clinic for 15 minutes afterwards, and to report any adverse reactions.     Screening performed by Sharmin London MA on 9/28/2023 at 1:51 PM.

## 2023-10-04 ENCOUNTER — ANCILLARY PROCEDURE (OUTPATIENT)
Dept: ULTRASOUND IMAGING | Facility: CLINIC | Age: 33
End: 2023-10-04
Attending: FAMILY MEDICINE
Payer: COMMERCIAL

## 2023-10-04 DIAGNOSIS — N92.6 IRREGULAR MENSES: ICD-10-CM

## 2023-10-04 PROCEDURE — 76830 TRANSVAGINAL US NON-OB: CPT | Mod: GC | Performed by: RADIOLOGY

## 2023-10-04 PROCEDURE — 76856 US EXAM PELVIC COMPLETE: CPT | Mod: GC | Performed by: RADIOLOGY

## 2023-10-05 PROBLEM — N80.03 ADENOMYOSIS OF THE UTERUS: Status: ACTIVE | Noted: 2023-10-05

## 2023-10-08 ENCOUNTER — HEALTH MAINTENANCE LETTER (OUTPATIENT)
Age: 33
End: 2023-10-08

## 2024-04-04 DIAGNOSIS — F41.8 DEPRESSION WITH ANXIETY: ICD-10-CM

## 2024-04-04 RX ORDER — ESCITALOPRAM OXALATE 10 MG/1
10 TABLET ORAL DAILY
Qty: 30 TABLET | Refills: 0 | Status: SHIPPED | OUTPATIENT
Start: 2024-04-04 | End: 2024-04-16

## 2024-04-04 NOTE — TELEPHONE ENCOUNTER
Due for.follow-up before further refills - needs annual exam         7/7/2020    11:23 AM 9/29/2020     8:00 AM 3/22/2022    11:03 AM   PHQ   PHQ-9 Total Score 2 1 9   Q9: Thoughts of better off dead/self-harm past 2 weeks Not at all Not at all Not at all

## 2024-04-05 NOTE — TELEPHONE ENCOUNTER
04/05 I called an left message for pt to call back to schedule at ;east a office visit  or a annual before Dr. Patel leaves for her sabatical

## 2024-04-16 ENCOUNTER — OFFICE VISIT (OUTPATIENT)
Dept: FAMILY MEDICINE | Facility: CLINIC | Age: 34
End: 2024-04-16
Payer: COMMERCIAL

## 2024-04-16 VITALS
SYSTOLIC BLOOD PRESSURE: 124 MMHG | HEIGHT: 64 IN | DIASTOLIC BLOOD PRESSURE: 76 MMHG | TEMPERATURE: 97.9 F | OXYGEN SATURATION: 99 % | HEART RATE: 61 BPM | BODY MASS INDEX: 33.72 KG/M2 | RESPIRATION RATE: 10 BRPM | WEIGHT: 197.5 LBS

## 2024-04-16 DIAGNOSIS — F41.8 DEPRESSION WITH ANXIETY: ICD-10-CM

## 2024-04-16 DIAGNOSIS — Z13.220 SCREENING, LIPID: ICD-10-CM

## 2024-04-16 DIAGNOSIS — Z00.00 ROUTINE GENERAL MEDICAL EXAMINATION AT A HEALTH CARE FACILITY: Primary | ICD-10-CM

## 2024-04-16 DIAGNOSIS — Z13.1 SCREENING FOR DIABETES MELLITUS: ICD-10-CM

## 2024-04-16 DIAGNOSIS — H93.A3 PULSATILE TINNITUS OF BOTH EARS: ICD-10-CM

## 2024-04-16 PROCEDURE — 99213 OFFICE O/P EST LOW 20 MIN: CPT | Mod: 25 | Performed by: FAMILY MEDICINE

## 2024-04-16 PROCEDURE — 96127 BRIEF EMOTIONAL/BEHAV ASSMT: CPT | Mod: 59 | Performed by: FAMILY MEDICINE

## 2024-04-16 PROCEDURE — 99395 PREV VISIT EST AGE 18-39: CPT | Performed by: FAMILY MEDICINE

## 2024-04-16 RX ORDER — ESCITALOPRAM OXALATE 10 MG/1
10 TABLET ORAL DAILY
Qty: 92 TABLET | Refills: 3 | Status: SHIPPED | OUTPATIENT
Start: 2024-04-16 | End: 2025-04-16

## 2024-04-16 SDOH — HEALTH STABILITY: PHYSICAL HEALTH: ON AVERAGE, HOW MANY MINUTES DO YOU ENGAGE IN EXERCISE AT THIS LEVEL?: 30 MIN

## 2024-04-16 SDOH — HEALTH STABILITY: PHYSICAL HEALTH: ON AVERAGE, HOW MANY DAYS PER WEEK DO YOU ENGAGE IN MODERATE TO STRENUOUS EXERCISE (LIKE A BRISK WALK)?: 4 DAYS

## 2024-04-16 ASSESSMENT — ANXIETY QUESTIONNAIRES
IF YOU CHECKED OFF ANY PROBLEMS ON THIS QUESTIONNAIRE, HOW DIFFICULT HAVE THESE PROBLEMS MADE IT FOR YOU TO DO YOUR WORK, TAKE CARE OF THINGS AT HOME, OR GET ALONG WITH OTHER PEOPLE: NOT DIFFICULT AT ALL
1. FEELING NERVOUS, ANXIOUS, OR ON EDGE: NEARLY EVERY DAY
2. NOT BEING ABLE TO STOP OR CONTROL WORRYING: NOT AT ALL
7. FEELING AFRAID AS IF SOMETHING AWFUL MIGHT HAPPEN: NOT AT ALL
GAD7 TOTAL SCORE: 3
5. BEING SO RESTLESS THAT IT IS HARD TO SIT STILL: NOT AT ALL
6. BECOMING EASILY ANNOYED OR IRRITABLE: NOT AT ALL
3. WORRYING TOO MUCH ABOUT DIFFERENT THINGS: NOT AT ALL
GAD7 TOTAL SCORE: 3

## 2024-04-16 ASSESSMENT — SOCIAL DETERMINANTS OF HEALTH (SDOH): HOW OFTEN DO YOU GET TOGETHER WITH FRIENDS OR RELATIVES?: ONCE A WEEK

## 2024-04-16 ASSESSMENT — PATIENT HEALTH QUESTIONNAIRE - PHQ9
SUM OF ALL RESPONSES TO PHQ QUESTIONS 1-9: 1
5. POOR APPETITE OR OVEREATING: NOT AT ALL

## 2024-04-16 NOTE — PROGRESS NOTES
"Preventive Care Visit  [facility]  Stefanie Patel MD, Family Medicine  Apr 16, 2024      Assessment & Plan     Routine general medical examination at a health care facility      Pulsatile tinnitus of both ears  This needs evaluation.  She also notes some hearing loss  - Adult Audiology  Referral  - Adult ENT  Referral    Depression with anxiety  Well controlled on  - escitalopram (LEXAPRO) 10 MG tablet  Dispense: 92 tablet; Refill: 3    Screening, lipid  - Lipid panel reflex to direct LDL Fasting    Screening for diabetes mellitus  - Glucose  - Extra Tube    Return in about 1 year (around 4/16/2025) for Routine preventive, or earlier as needed.      Nicotine/Tobacco Cessation  She reports that she has been smoking cigarettes. She has never used smokeless tobacco.  Nicotine/Tobacco Cessation Plan  Information offered: Patient not interested at this time      BMI  Estimated body mass index is 33.9 kg/m  as calculated from the following:    Height as of this encounter: 1.626 m (5' 4\").    Weight as of this encounter: 89.6 kg (197 lb 8 oz).   Weight management plan: She has improved her exercise and is finding that her clothes fit better    Counseling  Appropriate preventive services were discussed with this patient, including applicable screening as appropriate for fall prevention, nutrition, physical activity, Tobacco-use cessation, weight loss and cognition.  Checklist reviewing preventive services available has been given to the patient.  Reviewed patient's diet, addressing concerns and/or questions.   The patient was instructed to see the dentist every 6 months.       Mehdi Calvert is a 33 year old, presenting for the following:  Annual Visit        4/16/2024     2:02 PM   Additional Questions   Roomed by Donald SNYDER RN        Health Care Directive  Patient does not have a Health Care Directive or Living Will: Discussed advance care planning with patient; information given " "to patient to review.    Abdominal pain - mostly improved  \"I think I need to see an allergist - I wonder if I have other food allergies? They (previously) found a wheat and peanut allergy.\"  Antonio is feeling better with avoiding these foods, but has mild residual GI symptoms.      She had previously self-referred to [facility] for abdominal pain, but she felt that the consultation was weird and did not really give her new information.  Also they tacked on $50 at the end of the year after she already paid her bill.    I did offer to refer her in our [facility] system if her allergist does not have anything to add in terms of foods to avoid    Antonio has started to diet and work out but her clothes are fitting better    ---    I had ordered a pelvic ultrasound in the workup of her regular bleeding.  Rosa says Adenomyosis diagnosis has explained things   - pain during sex   - can tell since having her son -  before her menses she can \"see\" her uterus more -abdomen is more protuberant   - over 32 days between menses and she swell more   - oral contraceptives do not work. Progesterone only didn't work either   - ob had suggested copper IUD -she does not want that   -  only has one testicle, and it was difficult to get pregnant, so Antonio thinks it is at show and is unlikely, although she does conceive she will be fine with that too    Just noticed - a month and a half ago - could feel blood whooshing in ears - has stayed the same  Sometimes smokes a cigarettes on a bad day - and notices it more  Doens't hear voice louder   - no new headaches   - no recent upper respiratory infection    - has allergies   - Bold this pulsatile test comes and goes    - feels over the last couple of years hearing got worse - was a drummer for a long time    Anxiety and depression  Well controlled on:       9/29/2020     8:00 AM 3/22/2022    11:03 AM 4/16/2024     2:39 PM   PHQ   PHQ-9 Total Score 1 9 1   Q9: Thoughts of " better off dead/self-harm past 2 weeks Not at all Not at all Not at all          5/12/2020    11:53 AM 3/22/2022    11:03 AM 4/16/2024     2:39 PM   J LUIS-7 SCORE   Total Score  6 (mild anxiety)    Total Score 0 6 3      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    Preventive   - Declines Covid vaccine   - Occasional smoker -not enough to want medications to help quit.  Discussed postponement        4/16/2024   General Health   How would you rate your overall physical health? Good   Feel stress (tense, anxious, or unable to sleep) To some extent   (!) STRESS CONCERN      4/16/2024   Nutrition   Three or more servings of calcium each day? Yes   Diet: Gluten-free/reduced   How many servings of fruit and vegetables per day? (!) 2-3   How many sweetened beverages each day? 0-1         4/16/2024   Exercise   Days per week of moderate/strenuous exercise 4 days   Average minutes spent exercising at this level 30 min         4/16/2024   Social Factors   Frequency of gathering with friends or relatives Once a week - is able to work from home - now every day does yoga or HIT work out 4- days weekly - has less hip and joint pain   Worry food won't last until get money to buy more No   Food not last or not have enough money for food? No   Do you have housing?  Yes   Are you worried about losing your housing? No   Lack of transportation? No   Unable to get utilities (heat,electricity)? No         4/16/2024   Dental   Dentist two times every year? (!) NO - working on this         4/16/2024   TB Screening   Were you born outside of the [place]? No         Today's PHQ-2 Score:       4/16/2024    11:45 AM   PHQ-2 ( 1999 [employer])   Q1: Little interest or pleasure in doing things 0   Q2: Feeling down, depressed or hopeless 0   PHQ-2 Score 0   Q1: Little interest or pleasure in doing things Not at all   Q2: Feeling down,  "depressed or hopeless Not at all   PHQ-2 Score 0           4/16/2024   Substance Use   Alcohol more than 3/day or more than 7/wk No   Do you use any other substances recreationally? (!) CANNABIS PRODUCTS - every day - avid user daily - pre-baby heavy use daily - a gummy - discussed substitution     Social History     Tobacco Use    Smoking status: Some Days     Types: Cigarettes    Smokeless tobacco: Never    Tobacco comments:     Pt smokes 1 cigarette per week   Vaping Use    Vaping status: Some Days    Substances: THC    Devices: Pre-filled or refillable cartridge   Substance Use Topics    Alcohol use: Not Currently    Drug use: Never                  4/16/2024   STI Screening   New sexual partner(s) since last STI/HIV test? No     History of abnormal Pap smear: NO - age 30-65 PAP every 5 years with negative HPV co-testing recommended        Latest Ref Rng & Units 6/9/2023    10:04 AM 10/13/2020    12:02 PM 9/22/2017    10:54 AM   PAP / HPV   PAP  Negative for Intraepithelial Lesion or Malignancy (NILM)   Negative for squamous intraepithelial lesion or malignancy  Electronically signed by Sury Pollock CT (ASCP) on 10/2/2017 at  1:28 PM      PAP (Historical)   NIL     HPV 16 DNA Negative Negative  Negative     HPV 18 DNA Negative Negative  Negative     Other HR HPV Negative Negative  Negative             4/16/2024   Contraception/Family Planning   Questions about contraception or family planning No        Reviewed and updated as needed this visit by Provider                             Objective    Exam  /76 (BP Location: Left arm, Patient Position: Sitting, Cuff Size: Adult Regular)   Pulse 61   Temp 97.9  F (36.6  C) (Tympanic)   Resp 10   Ht 1.626 m (5' 4\")   Wt 89.6 kg (197 lb 8 oz)   LMP 04/08/2024 (Exact Date)   SpO2 99%   BMI 33.90 kg/m     Estimated body mass index is 33.9 kg/m  as calculated from the following:    Height as of this encounter: 1.626 m (5' 4\").    Weight as of this " encounter: 89.6 kg (197 lb 8 oz).    Physical Exam  General appearance - alert, well appearing, and in no distress  Mental status - normal mood, behavior, speech, dress, motor activity, and thought processes  Eyes - pupils equal and reactive, extraocular eye movements intact, funduscopic exam normal, discs flat and sharp  Ears - bilateral TM's and external ear canals normal  Mouth - mucous membranes moist, pharynx normal without lesions  Neck - supple, no significant adenopathy, carotids upstroke normal bilaterally, no bruits, thyroid exam: thyroid is normal in size without nodules or tenderness  Chest - clear to auscultation, no wheezes, rales or rhonchi, symmetric air entry  Heart - normal rate, regular rhythm, normal S1, S2, no murmurs, rubs, clicks or gallops  Abdomen - soft, nontender, nondistended, no masses or organomegaly  Breasts - breasts appear normal, no suspicious masses, no skin or nipple changes or axillary nodes  Neurological - alert, oriented, normal speech, no focal findings or movement disorder noted, DTR's normal and symmetric  Extremities - peripheral pulses normal, no pedal edema, no clubbing or cyanosis  Skin - no rashes or worrisome lesions      Signed Electronically by: Stefanie Patel MD

## 2024-04-17 NOTE — PATIENT INSTRUCTIONS
Preventive Care Advice   This is general advice given by our system to help you stay healthy. However, your care team may have specific advice just for you. Please talk to your care team about your preventive care needs.  Nutrition  Eat 5 or more servings of fruits and vegetables each day.  Try wheat bread, brown rice and whole grain pasta (instead of white bread, rice, and pasta).  Get enough calcium and vitamin D. Check the label on foods and aim for 100% of the RDA (recommended daily allowance).  Lifestyle  Exercise at least 150 minutes each week   (30 minutes a day, 5 days a week).  Do muscle strengthening activities 2 days a week. These help control your weight and prevent disease.  No smoking.  Wear sunscreen to prevent skin cancer.  Have a dental exam and cleaning every 6 months.  Yearly exams  See your health care team every year to talk about:  Any changes in your health.  Any medicines your care team has prescribed.  Preventive care, family planning, and ways to prevent chronic diseases.  Shots (vaccines)   HPV shots (up to age 26), if you've never had them before.  Hepatitis B shots (up to age 59), if you've never had them before.  COVID-19 shot: Get this shot when it's due.  Flu shot: Get a flu shot every year.  Tetanus shot: Get a tetanus shot every 10 years.  Pneumococcal, hepatitis A, and RSV shots: Ask your care team if you need these based on your risk.  Shingles shot (for age 50 and up).  General health tests  Diabetes screening:  Starting at age 35, Get screened for diabetes at least every 3 years.  If you are younger than age 35, ask your care team if you should be screened for diabetes.  Cholesterol test: At age 39, start having a cholesterol test every 5 years, or more often if advised.  Bone density scan (DEXA): At age 50, ask your care team if you should have this scan for osteoporosis (brittle bones).  Hepatitis C: Get tested at least once in your life.  STIs (sexually transmitted  infections)  Before age 24: Ask your care team if you should be screened for STIs.  After age 24: Get screened for STIs if you're at risk. You are at risk for STIs (including HIV) if:  You are sexually active with more than one person.  You don't use condoms every time.  You or a partner was diagnosed with a sexually transmitted infection.  If you are at risk for HIV, ask about PrEP medicine to prevent HIV.  Get tested for HIV at least once in your life, whether you are at risk for HIV or not.  Cancer screening tests  Cervical cancer screening: If you have a cervix, begin getting regular cervical cancer screening tests at age 21. Most people who have regular screenings with normal results can stop after age 65. Talk about this with your provider.  Breast cancer scan (mammogram): If you've ever had breasts, begin having regular mammograms starting at age 40. This is a scan to check for breast cancer.  Colon cancer screening: It is important to start screening for colon cancer at age 45.  Have a colonoscopy test every 10 years (or more often if you're at risk) Or, ask your provider about stool tests like a FIT test every year or Cologuard test every 3 years.  To learn more about your testing options, visit: https://www.Screenz/504249.pdf.  For help making a decision, visit: https://bit.ly/ou13105.  Prostate cancer screening test: If you have a prostate and are age 55 to 69, ask your provider if you would benefit from a yearly prostate cancer screening test.  Lung cancer screening: If you are a current or former smoker age 50 to 80, ask your care team if ongoing lung cancer screenings are right for you.  For informational purposes only. Not to replace the advice of your health care provider. Copyright   2023 [employer]. All rights reserved. Clinically reviewed by the [facility]itions [facility]. [employer] 375885 - REV 01/24.    Learning About Stress  What is stress?     Stress is your  body's response to a hard situation. Your body can have a physical, emotional, or mental response. Stress is a fact of life for most people, and it affects everyone differently. What causes stress for you may not be stressful for someone else.  A lot of things can cause stress. You may feel stress when you go on a job interview, take a test, or run a race. This kind of short-term stress is normal and even useful. It can help you if you need to work hard or react quickly. For example, stress can help you finish an important job on time.  Long-term stress is caused by ongoing stressful situations or events. Examples of long-term stress include long-term health problems, ongoing problems at work, or conflicts in your family. Long-term stress can harm your health.  How does stress affect your health?  When you are stressed, your body responds as though you are in danger. It makes hormones that speed up your heart, make you breathe faster, and give you a burst of energy. This is called the fight-or-flight stress response. If the stress is over quickly, your body goes back to normal and no harm is done.  But if stress happens too often or lasts too long, it can have bad effects. Long-term stress can make you more likely to get sick, and it can make symptoms of some diseases worse. If you tense up when you are stressed, you may develop neck, shoulder, or low back pain. Stress is linked to high blood pressure and heart disease.  Stress also harms your emotional health. It can make you subramanian, tense, or depressed. Your relationships may suffer, and you may not do well at work or school.  What can you do to manage stress?  You can try these things to help manage stress:   Do something active. Exercise or activity can help reduce stress. Walking is a great way to get started. Even everyday activities such as housecleaning or yard work can help.  Try yoga or lora chi. These techniques combine exercise and meditation. You may need  some training at first to learn them.  Do something you enjoy. For example, listen to music or go to a movie. Practice your hobby or do volunteer work.  Meditate. This can help you relax, because you are not worrying about what happened before or what may happen in the future.  Do guided imagery. Imagine yourself in any setting that helps you feel calm. You can use online videos, books, or a teacher to guide you.  Do breathing exercises. For example:  From a standing position, bend forward from the waist with your knees slightly bent. Let your arms dangle close to the floor.  Breathe in slowly and deeply as you return to a standing position. Roll up slowly and lift your head last.  Hold your breath for just a few seconds in the standing position.  Breathe out slowly and bend forward from the waist.  Let your feelings out. Talk, laugh, cry, and express anger when you need to. Talking with supportive friends or family, a counselor, or a jeff leader about your feelings is a healthy way to relieve stress. Avoid discussing your feelings with people who make you feel worse.  Write. It may help to write about things that are bothering you. This helps you find out how much stress you feel and what is causing it. When you know this, you can find better ways to cope.  What can you do to prevent stress?  You might try some of these things to help prevent stress:  Manage your time. This helps you find time to do the things you want and need to do.  Get enough sleep. Your body recovers from the stresses of the day while you are sleeping.  Get support. Your family, friends, and community can make a difference in how you experience stress.  Limit your news feed. Avoid or limit time on social media or news that may make you feel stressed.  Do something active. Exercise or activity can help reduce stress. Walking is a great way to get started.  Where can you learn more?  Go to https://www.healthwise.net/patiented  Enter N032 in the  "search box to learn more about \"Learning About Stress.\"  Current as of: October 24, 2023               Content Version: 14.0    9365-1819 [employer].   Care instructions adapted under license by your healthcare professional. If you have questions about a medical condition or this instruction, always ask your healthcare professional. [employer] disclaims any warranty or liability for your use of this information.      Substance Use Disorder: Care Instructions  Overview     You can improve your life and health by stopping your use of alcohol or drugs. When you don't drink or use drugs, you may feel and sleep better. You may get along better with your family, friends, and coworkers. There are medicines and programs that can help with substance use disorder.  How can you care for yourself at home?  Here are some ways to help you stay sober and prevent relapse.  If you have been given medicine to help keep you sober or reduce your cravings, be sure to take it exactly as prescribed.  Talk to your doctor about programs that can help you stop using drugs or drinking alcohol.  Do not keep alcohol or drugs in your home.  Plan ahead. Think about what you'll say if other people ask you to drink or use drugs. Try not to spend time with people who drink or use drugs.  Use the time and money spent on drinking or drugs to do something that's important to you.  Preventing a relapse  Have a plan to deal with relapse. Learn to recognize changes in your thinking that lead you to drink or use drugs. Get help before you start to drink or use drugs again.  Try to stay away from situations, friends, or places that may lead you to drink or use drugs.  If you feel the need to drink alcohol or use drugs again, seek help right away. Call a trusted friend or family member. Some people get support from organizations such as Narcotics Anonymous or [employer] or from treatment facilities.  If you relapse, get help " as soon as you can. Some people make a plan with another person that outlines what they want that person to do for them if they relapse. The plan usually includes how to handle the relapse and who to notify in case of relapse.  Don't give up. Remember that a relapse doesn't mean that you have failed. Use the experience to learn the triggers that lead you to drink or use drugs. Then quit again. Recovery is a lifelong process. Many people have several relapses before they are able to quit for good.  Follow-up care is a key part of your treatment and safety. Be sure to make and go to all appointments, and call your doctor if you are having problems. It's also a good idea to know your test results and keep a list of the medicines you take.  When should you call for help?   Call 911  anytime you think you may need emergency care. For example, call if you or someone else:    Has overdosed or has withdrawal signs. Be sure to tell the emergency workers that you are or someone else is using or trying to quit using drugs. Overdose or withdrawal signs may include:  Losing consciousness.  Seizure.  Seeing or hearing things that aren't there (hallucinations).     Is thinking or talking about suicide or harming others.   Where to get help 24 hours a day, 7 days a week   If you or someone you know talks about suicide, self-harm, a mental health crisis, a substance use crisis, or any other kind of emotional distress, get help right away. You can:    Call the Suicide and Crisis Lifeline at 980.     Call 0-141-177-TALK (1-796.822.2398).     Text HOME to 361998 to access the Crisis Text Line.   Consider saving these numbers in your phone.  Go to VesselVanguardline.org for more information or to chat online.  Call your doctor now or seek immediate medical care if:    You are having withdrawal symptoms. These may include nausea or vomiting, sweating, shakiness, and anxiety.   Watch closely for changes in your health, and be sure to contact  "your doctor if:    You have a relapse.     You need more help or support to stop.   Where can you learn more?  Go to https://www.healthRadioFrame.net/patiented  Enter H573 in the search box to learn more about \"Substance Use Disorder: Care Instructions.\"  Current as of: November 15, 2023               Content Version: 14.0    4677-5776 [employer].   Care instructions adapted under license by your healthcare professional. If you have questions about a medical condition or this instruction, always ask your healthcare professional. [employer], [employer] disclaims any warranty or liability for your use of this information.      "

## 2024-05-06 ENCOUNTER — MYC MEDICAL ADVICE (OUTPATIENT)
Dept: FAMILY MEDICINE | Facility: CLINIC | Age: 34
End: 2024-05-06
Payer: COMMERCIAL

## 2024-09-24 ENCOUNTER — TRANSFERRED RECORDS (OUTPATIENT)
Dept: HEALTH INFORMATION MANAGEMENT | Facility: CLINIC | Age: 34
End: 2024-09-24
Payer: COMMERCIAL

## 2025-03-17 ENCOUNTER — PATIENT OUTREACH (OUTPATIENT)
Dept: CARE COORDINATION | Facility: CLINIC | Age: 35
End: 2025-03-17
Payer: COMMERCIAL

## 2025-03-31 ENCOUNTER — PATIENT OUTREACH (OUTPATIENT)
Dept: CARE COORDINATION | Facility: CLINIC | Age: 35
End: 2025-03-31
Payer: COMMERCIAL

## 2025-05-17 ENCOUNTER — HEALTH MAINTENANCE LETTER (OUTPATIENT)
Age: 35
End: 2025-05-17

## 2025-05-26 DIAGNOSIS — F41.8 DEPRESSION WITH ANXIETY: ICD-10-CM

## 2025-05-27 NOTE — TELEPHONE ENCOUNTER
05/27 I called and left message to schedule a appt before further refills, don't know if the machine took it or not

## 2025-06-09 NOTE — TELEPHONE ENCOUNTER
06/09 I tried to call again and leave a message just got a beeep will send another my chart message

## 2025-06-10 RX ORDER — ESCITALOPRAM OXALATE 10 MG/1
10 TABLET ORAL DAILY
Qty: 30 TABLET | Refills: 0 | Status: SHIPPED | OUTPATIENT
Start: 2025-06-10

## 2025-06-23 SDOH — HEALTH STABILITY: PHYSICAL HEALTH: ON AVERAGE, HOW MANY MINUTES DO YOU ENGAGE IN EXERCISE AT THIS LEVEL?: 30 MIN

## 2025-06-23 SDOH — HEALTH STABILITY: PHYSICAL HEALTH: ON AVERAGE, HOW MANY DAYS PER WEEK DO YOU ENGAGE IN MODERATE TO STRENUOUS EXERCISE (LIKE A BRISK WALK)?: 3 DAYS

## 2025-06-23 ASSESSMENT — SOCIAL DETERMINANTS OF HEALTH (SDOH): HOW OFTEN DO YOU GET TOGETHER WITH FRIENDS OR RELATIVES?: ONCE A WEEK

## 2025-06-24 ENCOUNTER — OFFICE VISIT (OUTPATIENT)
Dept: FAMILY MEDICINE | Facility: CLINIC | Age: 35
End: 2025-06-24
Attending: FAMILY MEDICINE
Payer: COMMERCIAL

## 2025-06-24 VITALS
BODY MASS INDEX: 32.78 KG/M2 | HEIGHT: 64 IN | HEART RATE: 74 BPM | OXYGEN SATURATION: 98 % | SYSTOLIC BLOOD PRESSURE: 118 MMHG | WEIGHT: 192 LBS | TEMPERATURE: 98.4 F | DIASTOLIC BLOOD PRESSURE: 82 MMHG | RESPIRATION RATE: 21 BRPM

## 2025-06-24 DIAGNOSIS — Z51.81 ENCOUNTER FOR THERAPEUTIC DRUG MONITORING: ICD-10-CM

## 2025-06-24 DIAGNOSIS — N93.9 VAGINAL BLEEDING: ICD-10-CM

## 2025-06-24 DIAGNOSIS — Z13.1 SCREENING FOR DIABETES MELLITUS: ICD-10-CM

## 2025-06-24 DIAGNOSIS — Z00.00 ROUTINE GENERAL MEDICAL EXAMINATION AT A HEALTH CARE FACILITY: Primary | ICD-10-CM

## 2025-06-24 DIAGNOSIS — Z13.220 SCREENING, LIPID: ICD-10-CM

## 2025-06-24 DIAGNOSIS — F41.8 DEPRESSION WITH ANXIETY: ICD-10-CM

## 2025-06-24 PROCEDURE — 3074F SYST BP LT 130 MM HG: CPT | Performed by: FAMILY MEDICINE

## 2025-06-24 PROCEDURE — 99214 OFFICE O/P EST MOD 30 MIN: CPT | Mod: 25 | Performed by: FAMILY MEDICINE

## 2025-06-24 PROCEDURE — 3079F DIAST BP 80-89 MM HG: CPT | Performed by: FAMILY MEDICINE

## 2025-06-24 PROCEDURE — 1126F AMNT PAIN NOTED NONE PRSNT: CPT | Performed by: FAMILY MEDICINE

## 2025-06-24 PROCEDURE — 99395 PREV VISIT EST AGE 18-39: CPT | Performed by: FAMILY MEDICINE

## 2025-06-24 PROCEDURE — G2211 COMPLEX E/M VISIT ADD ON: HCPCS | Performed by: FAMILY MEDICINE

## 2025-06-24 RX ORDER — ESCITALOPRAM OXALATE 10 MG/1
10 TABLET ORAL DAILY
Qty: 90 TABLET | Refills: 3 | Status: SHIPPED | OUTPATIENT
Start: 2025-06-24

## 2025-06-24 ASSESSMENT — PAIN SCALES - GENERAL: PAINLEVEL_OUTOF10: NO PAIN (0)

## 2025-06-24 NOTE — PROGRESS NOTES
"Preventive Care Visit  [facility]  Stefanie Patel MD, Family Medicine  Jun 24, 2025      Assessment & Plan     Routine general medical examination at a health care facility      Depression with anxiety  Well-controlled  - escitalopram (LEXAPRO) 10 MG tablet  Dispense: 90 tablet; Refill: 3    Vaginal bleeding  Has occurred in the last 2 months about 2 days after intercourse.  She does have a history of adenomyosis by ultrasound.  Further evaluation with  - US Pelvic Complete with Transvaginal    Encounter for therapeutic drug monitoring  Reasonable monitoring for Lexapro  - Basic metabolic panel  (Ca, Cl, CO2, Creat, Gluc, K, Na, BUN)    Screening for diabetes mellitus  - Extra Tube  - Basic metabolic panel  (Ca, Cl, CO2, Creat, Gluc, K, Na, BUN)    Screening, lipid  - Lipid panel reflex to direct LDL Fasting    Patient specific instructions:  Message me when you would like a referral to audiology and/or ENT; and psychotherapy if needed    If your fasting blood sugar is high, send a message or CALL, saying that Dr. Patel ordered an extra tube of blood in order to be able to do an A1C, but the A1C has to be ORDERED by a provider.    Return in about 1 year (around 6/24/2026) for Routine preventive, or earlier as needed.      BMI  Estimated body mass index is 32.96 kg/m  as calculated from the following:    Height as of this encounter: 1.626 m (5' 4\").    Weight as of this encounter: 87.1 kg (192 lb).   Weight management plan: Discussed healthy diet and exercise guidelines    Counseling  Appropriate preventive services were addressed with this patient via screening, questionnaire, or discussion as appropriate for  nutrition, physical activity,, weight loss and cognition.    No counseling necessary for: fall prevention,Tobacco-use cessation, social engagement,        Checklist reviewing preventive services available has been given to the patient.  Reviewed patient's diet, addressing concerns " "and/or questions.   She is at risk for lack of exercise and has been provided with information to increase physical activity for the benefit of her well-being.   The patient was instructed to see the dentist every 6 months.   She is at risk for psychosocial distress and has been provided with information to reduce risk.             Mehdi Calvert is a 34 year old, presenting for the following:  Physical    At Antonio's last annual visit she mentions pulsatile tinnitus and I referred her to ENT and audiology.  However she says shortly after that her father had a heart attack and that understandably shifted her priorities.    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  She says her company is getting the structured and she is not sure what this will bring.  She has a son who will be in  this fall.    Depression with anxiety: She really appreciates Lexapro, adding \"I would work for them.\"  She does however states she would eventually like to taper Lexapro when her life evens out.    She is also interested in psychotherapy noting that she thinks she might have obsessive-compulsive disorder.  OCD.  She notes that as a child there were summers where she would lock herself in the basement and watch the weather channel to make sure there were not any coming severe storms.  And now if she is really anxious she cannot go to bed unless she is touched every window in the house      Vaginal spotting: In the last 2 months, she has spotting 2 days after intimacy with her .  She reminds me the previous ultrasound I ordered which showed adenomyosis she is little concerned because last week eating occurred for the whole day although it was light.  She has no pelvic pain    Preventive: She will wait to get the " COVID-vaccine from [employer]        6/24/2025    10:05 AM   Additional Questions   Roomed by farzad lara         6/24/2025    10:09 AM   Patient Reported Additional Medications   Patient reports taking the following new medications biotin          HPI    Advance Care Planning    Discussed advance care planning with patient; informed AVS has link to Honoring Choices.honoring choices document provided and discussed         6/23/2025   General Health   How would you rate your overall physical health? Good   Feel stress (tense, anxious, or unable to sleep) To some extent   (!) STRESS CONCERN      6/23/2025   Nutrition   Three or more servings of calcium each day? Yes   Diet: Gluten-free/reduced   How many servings of fruit and vegetables per day? (!) 2-3   How many sweetened beverages each day? 0-1         6/23/2025   Exercise   Days per week of moderate/strenuous exercise 3 days   Average minutes spent exercising at this level 30 min         6/23/2025   Social Factors   Frequency of gathering with friends or relatives Once a week   Worry food won't last until get money to buy more No   Food not last or not have enough money for food? No   Do you have housing? (Housing is defined as stable permanent housing and does not include staying outside in a car, in a tent, in an abandoned building, in an overnight shelter, or couch-surfing.) Yes   Are you worried about losing your housing? No   Lack of transportation? No   Unable to get utilities (heat,electricity)? No         6/23/2025   Dental   Dentist two times every year? (!) NO         Today's PHQ-2 Score:       6/23/2025     4:21 PM   PHQ-2 ( 1999 [employer])   Q1: Little interest or pleasure in doing things 0   Q2: Feeling down, depressed or hopeless 0   PHQ-2 Score 0    Q1: Little interest or pleasure in doing things Not at all   Q2: Feeling down, depressed or hopeless Not at all   PHQ-2 Score 0       Patient-reported           6/23/2025   Substance Use   Alcohol more than 3/day or  "more than 7/wk No   Do you use any other substances recreationally? (!) CANNABIS PRODUCTS     Social History     Tobacco Use    Smoking status: Former     Current packs/day: 0.00     Types: Cigarettes     Quit date: 2025     Years since quittin.4     Passive exposure: Current    Smokeless tobacco: Never    Tobacco comments:     Pt smokes 1 cigarette per week   Vaping Use    Vaping status: Former    Substances: THC    Devices: Pre-filled or refillable cartridge   Substance Use Topics    Alcohol use: Not Currently    Drug use: Never          Mammogram Screening - Patient under 40 years of age: Routine Mammogram Screening not recommended.         2025   STI Screening   New sexual partner(s) since last STI/HIV test? No     History of abnormal Pap smear: No - age 30- 64 PAP with HPV every 5 years recommended        Latest Ref Rng & Units 2023    10:04 AM 10/13/2020    12:02 PM 2017    10:54 AM   PAP / HPV   PAP  Negative for Intraepithelial Lesion or Malignancy (NILM)   Negative for squamous intraepithelial lesion or malignancy  Electronically signed by Sury Pollock CT (ASCP) on 10/2/2017 at  1:28 PM      PAP (Historical)   NIL     HPV 16 DNA Negative Negative  Negative     HPV 18 DNA Negative Negative  Negative     Other HR HPV Negative Negative  Negative             2025   Contraception/Family Planning   Questions about contraception or family planning No   What are your periods like? (!) IRREGULAR        Reviewed and updated as needed this visit by Provider                  No family history of breast, colon or ovarian cancer             Objective    Exam  /82 (BP Location: Left arm, Patient Position: Sitting, Cuff Size: Adult Regular)   Pulse 74   Temp 98.4  F (36.9  C)   Resp 21   Ht 1.626 m (5' 4\")   Wt 87.1 kg (192 lb)   LMP 2025   SpO2 98%   BMI 32.96 kg/m     Estimated body mass index is 32.96 kg/m  as calculated from the following:    Height as of this " "encounter: 1.626 m (5' 4\").    Weight as of this encounter: 87.1 kg (192 lb).    Physical Exam  General appearance - alert, well appearing, and in no distress  Mental status - normal mood, behavior, speech, dress, motor activity, and thought processes  Eyes - pupils equal and reactive, extraocular eye movements intact, funduscopic exam normal, discs flat and sharp  Ears - bilateral TM's and external ear canals normal  Mouth - mucous membranes moist, pharynx normal without lesions  Neck - supple, no significant adenopathy, carotids upstroke normal bilaterally, no bruits, thyroid exam: thyroid is normal in size without nodules or tenderness  Chest - clear to auscultation, no wheezes, rales or rhonchi, symmetric air entry  Heart - normal rate, regular rhythm, normal S1, S2, no murmurs, rubs, clicks or gallops  Abdomen - soft, nontender, nondistended, no masses or organomegaly  Breasts - breasts appear normal, no suspicious masses, no skin or nipple changes or axillary nodes  Neurological - alert, oriented, normal speech, no focal findings or movement disorder noted, DTR's normal and symmetric  Extremities - peripheral pulses normal, no pedal edema, no clubbing or cyanosis  Skin - no rashes or worrisome lesions          Signed Electronically by: Stefanie Patel MD    "

## 2025-06-24 NOTE — PATIENT INSTRUCTIONS
"Message me when you would like a referral to audiology and/or ENT; and psychotherapy if needed    If your fasting blood sugar is high, send a message or CALL, saying that Dr. Patel ordered an extra tube of blood in order to be able to do an A1C, but the A1C has to be ORDERED by a provider.  ---   Advanced directives are a document that lets us know who talks for you and what your desires are in a medical situation where you are unable to talk for yourself    Here is a useful web site that includes a link to the honoring choices form (under how do I document my choices?):    [email or web address]    The three must haves for this form are  1) who speaks for you (health care agent if you cannot speak for yourself, and what 'sauceda' they have  2) what interventions you want if yo are permanently unconscious  3) making this legal - either by notary, or by two signatures of witnesses, neither of who is your health care agent    There are other parts to the form that are optional    No rush for this! Just something we are supposed to discuss every so often.    If you complete the form, and can make a pdf of it, you can send me a message with that pdf attached and it will become part of your chart here; if you cannot make an electronic copy to send by [employer], mailing the document is also fine.  You might also consider putting it on your fridge under \"Emergency Instructions.,\" as well as having a copy for yourself and your family.    Please let me know if you have any questions.    Stefanie Patel MD   Patient Education   Preventive Care Advice   This is general advice given by our system to help you stay healthy. However, your care team may have specific advice just for you. Please talk to your care team about your preventive care needs.  Nutrition  Eat 5 or more servings of fruits and vegetables each day.  Try wheat bread, brown rice and whole grain pasta (instead " of white bread, rice, and pasta).  Get enough calcium and vitamin D. Check the label on foods and aim for 100% of the RDA (recommended daily allowance).  Lifestyle  Exercise at least 150 minutes each week  (30 minutes a day, 5 days a week).  Do muscle strengthening activities 2 days a week. These help control your weight and prevent disease.  No smoking.  Wear sunscreen to prevent skin cancer.  Have a dental exam and cleaning every 6 months.  Yearly exams  See your health care team every year to talk about:  Any changes in your health.  Any medicines your care team has prescribed.  Preventive care, family planning, and ways to prevent chronic diseases.  Shots (vaccines)   HPV shots (up to age 26), if you've never had them before.  Hepatitis B shots (up to age 59), if you've never had them before.  COVID-19 shot: Get this shot when it's due.  Flu shot: Get a flu shot every year.  Tetanus shot: Get a tetanus shot every 10 years.  Pneumococcal, hepatitis A, and RSV shots: Ask your care team if you need these based on your risk.  Shingles shot (for age 50 and up)  General health tests  Diabetes screening:  Starting at age 35, Get screened for diabetes at least every 3 years.  If you are younger than age 35, ask your care team if you should be screened for diabetes.  Cholesterol test: At age 39, start having a cholesterol test every 5 years, or more often if advised.  Bone density scan (DEXA): At age 50, ask your care team if you should have this scan for osteoporosis (brittle bones).  Hepatitis C: Get tested at least once in your life.  STIs (sexually transmitted infections)  Before age 24: Ask your care team if you should be screened for STIs.  After age 24: Get screened for STIs if you're at risk. You are at risk for STIs (including HIV) if:  You are sexually active with more than one person.  You don't use condoms every time.  You or a partner was diagnosed with a sexually transmitted infection.  If you are at risk  for HIV, ask about PrEP medicine to prevent HIV.  Get tested for HIV at least once in your life, whether you are at risk for HIV or not.  Cancer screening tests  Cervical cancer screening: If you have a cervix, begin getting regular cervical cancer screening tests starting at age 21.  Breast cancer scan (mammogram): If you've ever had breasts, begin having regular mammograms starting at age 40. This is a scan to check for breast cancer.  Colon cancer screening: It is important to start screening for colon cancer at age 45.  Have a colonoscopy test every 10 years (or more often if you're at risk) Or, ask your provider about stool tests like a FIT test every year or Cologuard test every 3 years.  To learn more about your testing options, visit:   .  For help making a decision, visit:   https://bit.ly/ia79028.  Prostate cancer screening test: If you have a prostate, ask your care team if a prostate cancer screening test (PSA) at age 55 is right for you.  Lung cancer screening: If you are a current or former smoker ages 50 to 80, ask your care team if ongoing lung cancer screenings are right for you.  For informational purposes only. Not to replace the advice of your health care provider. Copyright   2023 [facility]. All rights reserved. Clinically reviewed by the [facility] Transitions Program. Pluck 124831 - REV 01/24.  Learning About Stress  What is stress?     Stress is your body's response to a hard situation. Your body can have a physical, emotional, or mental response. Stress is a fact of life for most people, and it affects everyone differently. What causes stress for you may not be stressful for someone else.  A lot of things can cause stress. You may feel stress when you go on a job interview, take a test, or run a race. This kind of short-term stress is normal and even useful. It can help you if you need to work hard or react quickly. For example, stress can help you finish an  important job on time.  Long-term stress is caused by ongoing stressful situations or events. Examples of long-term stress include long-term health problems, ongoing problems at work, or conflicts in your family. Long-term stress can harm your health.  How does stress affect your health?  When you are stressed, your body responds as though you are in danger. It makes hormones that speed up your heart, make you breathe faster, and give you a burst of energy. This is called the fight-or-flight stress response. If the stress is over quickly, your body goes back to normal and no harm is done.  But if stress happens too often or lasts too long, it can have bad effects. Long-term stress can make you more likely to get sick, and it can make symptoms of some diseases worse. If you tense up when you are stressed, you may develop neck, shoulder, or low back pain. Stress is linked to high blood pressure and heart disease.  Stress also harms your emotional health. It can make you subramanian, tense, or depressed. Your relationships may suffer, and you may not do well at work or school.  What can you do to manage stress?  You can try these things to help manage stress:   Do something active. Exercise or activity can help reduce stress. Walking is a great way to get started. Even everyday activities such as housecleaning or yard work can help.  Try yoga or lora chi. These techniques combine exercise and meditation. You may need some training at first to learn them.  Do something you enjoy. For example, listen to music or go to a movie. Practice your hobby or do volunteer work.  Meditate. This can help you relax, because you are not worrying about what happened before or what may happen in the future.  Do guided imagery. Imagine yourself in any setting that helps you feel calm. You can use online videos, books, or a teacher to guide you.  Do breathing exercises. For example:  From a standing position, bend forward from the waist with  "your knees slightly bent. Let your arms dangle close to the floor.  Breathe in slowly and deeply as you return to a standing position. Roll up slowly and lift your head last.  Hold your breath for just a few seconds in the standing position.  Breathe out slowly and bend forward from the waist.  Let your feelings out. Talk, laugh, cry, and express anger when you need to. Talking with supportive friends or family, a counselor, or a jeff leader about your feelings is a healthy way to relieve stress. Avoid discussing your feelings with people who make you feel worse.  Write. It may help to write about things that are bothering you. This helps you find out how much stress you feel and what is causing it. When you know this, you can find better ways to cope.  What can you do to prevent stress?  You might try some of these things to help prevent stress:  Manage your time. This helps you find time to do the things you want and need to do.  Get enough sleep. Your body recovers from the stresses of the day while you are sleeping.  Get support. Your family, friends, and community can make a difference in how you experience stress.  Limit your news feed. Avoid or limit time on social media or news that may make you feel stressed.  Do something active. Exercise or activity can help reduce stress. Walking is a great way to get started.  Where can you learn more?  Go to https://www.[employer].net/patiented  Enter N032 in the search box to learn more about \"Learning About Stress.\"  Current as of: October 24, 2024  Content Version: 14.5 2024-2025 [employer].   Care instructions adapted under license by your healthcare professional. If you have questions about a medical condition or this instruction, always ask your healthcare professional. [employer] disclaims any warranty or liability for your use of this information.    Substance Use Disorder: Care Instructions  Overview     You can improve your life " and health by stopping your use of alcohol or drugs. When you don't drink or use drugs, you may feel and sleep better. You may get along better with your family, friends, and coworkers. There are medicines and programs that can help with substance use disorder.  How can you care for yourself at home?  Here are some ways to help you stay sober and prevent relapse.  If you have been given medicine to help keep you sober or reduce your cravings, be sure to take it exactly as prescribed.  Talk to your doctor about programs that can help you stop using drugs or drinking alcohol.  Do not keep alcohol or drugs in your home.  Plan ahead. Think about what you'll say if other people ask you to drink or use drugs. Try not to spend time with people who drink or use drugs.  Use the time and money spent on drinking or drugs to do something that's important to you.  Preventing a relapse  Have a plan to deal with relapse. Learn to recognize changes in your thinking that lead you to drink or use drugs. Get help before you start to drink or use drugs again.  Try to stay away from situations, friends, or places that may lead you to drink or use drugs.  If you feel the need to drink alcohol or use drugs again, seek help right away. Call a trusted friend or family member. Some people get support from organizations such as Narcotics Anonymous or Pathflow or from treatment facilities.  If you relapse, get help as soon as you can. Some people make a plan with another person that outlines what they want that person to do for them if they relapse. The plan usually includes how to handle the relapse and who to notify in case of relapse.  Don't give up. Remember that a relapse doesn't mean that you have failed. Use the experience to learn the triggers that lead you to drink or use drugs. Then quit again. Recovery is a lifelong process. Many people have several relapses before they are able to quit for good.  Follow-up care is a key part  "of your treatment and safety. Be sure to make and go to all appointments, and call your doctor if you are having problems. It's also a good idea to know your test results and keep a list of the medicines you take.  When should you call for help?   Call 911  anytime you think you may need emergency care. For example, call if you or someone else:    Has overdosed or has withdrawal signs. Be sure to tell the emergency workers that you are or someone else is using or trying to quit using drugs. Overdose or withdrawal signs may include:  Losing consciousness.  Seizure.  Seeing or hearing things that aren't there (hallucinations).     Is thinking or talking about suicide or harming others.   Where to get help 24 hours a day, 7 days a week   If you or someone you know talks about suicide, self-harm, a mental health crisis, a substance use crisis, or any other kind of emotional distress, get help right away. You can:    Call the Suicide and Crisis Lifeline at 988.     Call 5-588-245-IKHE (1-663.780.2319).     Text HOME to 097764 to access the Crisis Text Line.   Consider saving these numbers in your phone.  Go to [employer] for more information or to chat online.  Call your doctor now or seek immediate medical care if:    You are having withdrawal symptoms. These may include nausea or vomiting, sweating, shakiness, and anxiety.   Watch closely for changes in your health, and be sure to contact your doctor if:    You have a relapse.     You need more help or support to stop.   Where can you learn more?  Go to https://www.[employer].net/patiented  Enter H573 in the search box to learn more about \"Substance Use Disorder: Care Instructions.\"  Current as of: August 20, 2024  Content Version: 14.5    0443-3717 [employer].   Care instructions adapted under license by your healthcare professional. If you have questions about a medical condition or this instruction, always ask your healthcare professional. [employer] disclaims any warranty or liability for your use of this information.

## 2025-07-09 ENCOUNTER — LAB (OUTPATIENT)
Dept: LAB | Facility: CLINIC | Age: 35
End: 2025-07-09
Payer: COMMERCIAL

## 2025-07-09 DIAGNOSIS — Z13.220 SCREENING, LIPID: ICD-10-CM

## 2025-07-09 DIAGNOSIS — Z51.81 ENCOUNTER FOR THERAPEUTIC DRUG MONITORING: ICD-10-CM

## 2025-07-09 DIAGNOSIS — Z13.1 SCREENING FOR DIABETES MELLITUS: ICD-10-CM

## 2025-07-09 LAB
ANION GAP SERPL CALCULATED.3IONS-SCNC: 10 MMOL/L (ref 7–15)
BUN SERPL-MCNC: 10.4 MG/DL (ref 6–20)
CALCIUM SERPL-MCNC: 8.9 MG/DL (ref 8.8–10.4)
CHLORIDE SERPL-SCNC: 106 MMOL/L (ref 98–107)
CHOLEST SERPL-MCNC: 176 MG/DL
CREAT SERPL-MCNC: 0.64 MG/DL (ref 0.51–0.95)
EGFRCR SERPLBLD CKD-EPI 2021: >90 ML/MIN/1.73M2
FASTING STATUS PATIENT QL REPORTED: NORMAL
FASTING STATUS PATIENT QL REPORTED: NORMAL
GLUCOSE SERPL-MCNC: 98 MG/DL (ref 70–99)
HCO3 SERPL-SCNC: 23 MMOL/L (ref 22–29)
HDLC SERPL-MCNC: 56 MG/DL
HOLD SPECIMEN: NORMAL
LDLC SERPL CALC-MCNC: 99 MG/DL
NONHDLC SERPL-MCNC: 120 MG/DL
POTASSIUM SERPL-SCNC: 4 MMOL/L (ref 3.4–5.3)
SODIUM SERPL-SCNC: 139 MMOL/L (ref 135–145)
TRIGL SERPL-MCNC: 105 MG/DL

## 2025-07-09 PROCEDURE — 80061 LIPID PANEL: CPT

## 2025-07-09 PROCEDURE — 36415 COLL VENOUS BLD VENIPUNCTURE: CPT

## 2025-07-09 PROCEDURE — 80048 BASIC METABOLIC PNL TOTAL CA: CPT

## (undated) DEVICE — SUCTION MANIFOLD DORNOCH ULTRA CART UL-CL500

## (undated) DEVICE — SOL WATER IRRIG 1000ML BOTTLE 2F7114

## (undated) DEVICE — ESU PENCIL W/HOLSTER E2350H

## (undated) DEVICE — DECANTER TRANSFER DEVICE 2008S

## (undated) DEVICE — ESU ELEC NDL 1" E1552

## (undated) DEVICE — Device

## (undated) DEVICE — ESU GROUND PAD UNIVERSAL W/O CORD

## (undated) DEVICE — PAD CHUX UNDERPAD 30X36" P3036C

## (undated) DEVICE — LINEN GOWN X4 5410

## (undated) DEVICE — LINEN TOWEL PACK X5 5464

## (undated) DEVICE — GOWN XLG DISP 9545

## (undated) DEVICE — STRAP KNEE/BODY 31143004

## (undated) DEVICE — SU VICRYL 3-0 SH 27" J316H

## (undated) DEVICE — SOL NACL 0.9% IRRIG 1000ML BOTTLE 2F7124

## (undated) DEVICE — GLOVE PROTEXIS W/NEU-THERA 6.0  2D73TE60

## (undated) DEVICE — GLOVE PROTEXIS BLUE W/NEU-THERA 6.5  2D73EB65

## (undated) RX ORDER — ONDANSETRON 2 MG/ML
INJECTION INTRAMUSCULAR; INTRAVENOUS
Status: DISPENSED
Start: 2020-10-13

## (undated) RX ORDER — LIDOCAINE HYDROCHLORIDE 20 MG/ML
INJECTION, SOLUTION EPIDURAL; INFILTRATION; INTRACAUDAL; PERINEURAL
Status: DISPENSED
Start: 2020-10-13

## (undated) RX ORDER — DEXAMETHASONE SODIUM PHOSPHATE 4 MG/ML
INJECTION, SOLUTION INTRA-ARTICULAR; INTRALESIONAL; INTRAMUSCULAR; INTRAVENOUS; SOFT TISSUE
Status: DISPENSED
Start: 2020-10-13

## (undated) RX ORDER — ACETAMINOPHEN 325 MG/1
TABLET ORAL
Status: DISPENSED
Start: 2020-10-13

## (undated) RX ORDER — PROPOFOL 10 MG/ML
INJECTION, EMULSION INTRAVENOUS
Status: DISPENSED
Start: 2020-10-13

## (undated) RX ORDER — KETOROLAC TROMETHAMINE 30 MG/ML
INJECTION, SOLUTION INTRAMUSCULAR; INTRAVENOUS
Status: DISPENSED
Start: 2020-10-13

## (undated) RX ORDER — LIDOCAINE HYDROCHLORIDE 10 MG/ML
INJECTION, SOLUTION EPIDURAL; INFILTRATION; INTRACAUDAL; PERINEURAL
Status: DISPENSED
Start: 2020-10-13

## (undated) RX ORDER — FENTANYL CITRATE 50 UG/ML
INJECTION, SOLUTION INTRAMUSCULAR; INTRAVENOUS
Status: DISPENSED
Start: 2020-10-13